# Patient Record
Sex: MALE | Race: WHITE | NOT HISPANIC OR LATINO | Employment: UNEMPLOYED
[De-identification: names, ages, dates, MRNs, and addresses within clinical notes are randomized per-mention and may not be internally consistent; named-entity substitution may affect disease eponyms.]

---

## 2017-02-10 DIAGNOSIS — J45.21 MILD INTERMITTENT ASTHMA WITH ACUTE EXACERBATION: ICD-10-CM

## 2017-02-10 RX ORDER — ALBUTEROL SULFATE 90 UG/1
AEROSOL, METERED RESPIRATORY (INHALATION)
Qty: 1 INHALER | Refills: 0 | Status: SHIPPED | OUTPATIENT
Start: 2017-02-10 | End: 2017-07-29

## 2017-02-10 NOTE — TELEPHONE ENCOUNTER
From: Garry Beatty  To: Norma Richards DO  Sent: 2/10/2017 9:35 AM CST  Subject: Medication Renewal Request    Original authorizing provider: DO Garry Kuhn would like a refill of the following medications:  Mometasone Furo-

## 2017-03-07 ENCOUNTER — HOSPITAL (OUTPATIENT)
Dept: OTHER | Age: 22
End: 2017-03-07
Attending: PHYSICAL MEDICINE & REHABILITATION

## 2017-03-22 ENCOUNTER — CHARTING TRANS (OUTPATIENT)
Dept: OTHER | Age: 22
End: 2017-03-22

## 2017-03-22 ENCOUNTER — HOSPITAL (OUTPATIENT)
Dept: OTHER | Age: 22
End: 2017-03-22
Attending: PHYSICAL MEDICINE & REHABILITATION

## 2017-03-29 RX ORDER — OMEPRAZOLE 20 MG/1
20 CAPSULE, DELAYED RELEASE ORAL AS NEEDED
COMMUNITY

## 2017-04-05 ENCOUNTER — ANESTHESIA EVENT (OUTPATIENT)
Dept: ENDOSCOPY | Facility: HOSPITAL | Age: 22
End: 2017-04-05
Payer: COMMERCIAL

## 2017-04-06 ENCOUNTER — ANESTHESIA (OUTPATIENT)
Dept: ENDOSCOPY | Facility: HOSPITAL | Age: 22
End: 2017-04-06
Payer: COMMERCIAL

## 2017-04-06 ENCOUNTER — SURGERY (OUTPATIENT)
Age: 22
End: 2017-04-06

## 2017-04-06 ENCOUNTER — HOSPITAL ENCOUNTER (OUTPATIENT)
Facility: HOSPITAL | Age: 22
Setting detail: HOSPITAL OUTPATIENT SURGERY
Discharge: HOME | End: 2017-04-06
Attending: INTERNAL MEDICINE | Admitting: INTERNAL MEDICINE
Payer: COMMERCIAL

## 2017-04-06 VITALS
WEIGHT: 145 LBS | OXYGEN SATURATION: 99 % | HEART RATE: 63 BPM | TEMPERATURE: 98 F | BODY MASS INDEX: 20.76 KG/M2 | RESPIRATION RATE: 20 BRPM | DIASTOLIC BLOOD PRESSURE: 74 MMHG | HEIGHT: 70 IN | SYSTOLIC BLOOD PRESSURE: 108 MMHG

## 2017-04-06 PROCEDURE — 0DB68ZX EXCISION OF STOMACH, VIA NATURAL OR ARTIFICIAL OPENING ENDOSCOPIC, DIAGNOSTIC: ICD-10-PCS | Performed by: INTERNAL MEDICINE

## 2017-04-06 PROCEDURE — 0DB98ZX EXCISION OF DUODENUM, VIA NATURAL OR ARTIFICIAL OPENING ENDOSCOPIC, DIAGNOSTIC: ICD-10-PCS | Performed by: INTERNAL MEDICINE

## 2017-04-06 PROCEDURE — 88305 TISSUE EXAM BY PATHOLOGIST: CPT | Performed by: INTERNAL MEDICINE

## 2017-04-06 RX ORDER — SODIUM CHLORIDE, SODIUM LACTATE, POTASSIUM CHLORIDE, CALCIUM CHLORIDE 600; 310; 30; 20 MG/100ML; MG/100ML; MG/100ML; MG/100ML
INJECTION, SOLUTION INTRAVENOUS CONTINUOUS
Status: DISCONTINUED | OUTPATIENT
Start: 2017-04-06 | End: 2017-04-06

## 2017-04-06 RX ORDER — NALOXONE HYDROCHLORIDE 0.4 MG/ML
80 INJECTION, SOLUTION INTRAMUSCULAR; INTRAVENOUS; SUBCUTANEOUS AS NEEDED
Status: DISCONTINUED | OUTPATIENT
Start: 2017-04-06 | End: 2017-04-06

## 2017-04-06 NOTE — H&P
History & Physical Examination    Patient Name: Niki Del Real  MRN: [id number]  CSN: [id number]  YOB: 1995    Diagnosis: Epigastric pain    Present Illness: Paraplegia, abdominal pain    Meds:omeprazole 20 MG Oral Capsule Delayed Release Ta allergies.     Past Medical History   Diagnosis Date   • Unspecified drug dependence, unspecified 04/2011     at [facility]   • Suicide and self-inflicted poisoning by unspecified drug or medicinal substance 4/11   • Unspecified site of spinal cord injury

## 2017-04-06 NOTE — OPERATIVE REPORT
Operative Report-Esophagogastroduodenoscopy      PREOPERATIVE DIAGNOSIS/INDICATION:  1. Epigastric pain  2. Nausea  POSTOPERTATIVE DIAGNOSIS:  1. Mild gastritis  2.  Otherwise normal upper endoscopy s/p biopsy  PROCEDURE PERFORMED:

## 2017-04-06 NOTE — ANESTHESIA POSTPROCEDURE EVALUATION
[address] Patient Status:  Hospital Outpatient Surgery   Age/Gender 23year old male MRN [id number]   Location [facility]. Attending No att. providers found   Hosp Day # 0 PCP Ruth Maharaj, DO       Anesthesia Post

## 2017-04-06 NOTE — ANESTHESIA PREPROCEDURE EVALUATION
PRE-OP EVALUATION    Patient Name: Wilfredo Chandler    Pre-op Diagnosis: NAUSEA WITH VOMITING, EPIGASTRIC ABDOMINAL PAIN    Procedure(s):  ESOPHAGOGASTRODUODENOSCOPY    Surgeon(s) and Role:     Guy Rene MD - Primary    Pre-op vitals reviewed Endo/Other                                  Pulmonary      (+) asthma                     Neuro/Psych  Comment: Neurogenic bladder. Paralysis of both legs.     (+) depression                                  Past Surgical History    LAMINECTOMY,>2 SGMT,LUM

## 2017-04-10 RX ORDER — MOMETASONE FUROATE AND FORMOTEROL FUMARATE DIHYDRATE 200; 5 UG/1; UG/1
AEROSOL RESPIRATORY (INHALATION)
Qty: 1 INHALER | Refills: 0 | Status: SHIPPED | OUTPATIENT
Start: 2017-04-10 | End: 2018-01-31

## 2017-04-10 NOTE — TELEPHONE ENCOUNTER
Pt last seen with Dr. Latoya Preciado 10/2016 for rectal bleeding. Pt will need appt with Dr. Latoya Preciado for further refills. Please call patient's Mom and have him schedule his appt.

## 2017-05-25 ENCOUNTER — TELEPHONE (OUTPATIENT)
Dept: FAMILY MEDICINE CLINIC | Facility: CLINIC | Age: 22
End: 2017-05-25

## 2017-05-25 ENCOUNTER — OFFICE VISIT (OUTPATIENT)
Dept: SURGERY | Facility: CLINIC | Age: 22
End: 2017-05-25

## 2017-05-25 VITALS
TEMPERATURE: 98 F | RESPIRATION RATE: 16 BRPM | HEIGHT: 70 IN | SYSTOLIC BLOOD PRESSURE: 128 MMHG | DIASTOLIC BLOOD PRESSURE: 80 MMHG | HEART RATE: 77 BPM

## 2017-05-25 DIAGNOSIS — M53.3 COCCYGODYNIA: Primary | ICD-10-CM

## 2017-05-25 PROCEDURE — 99245 OFF/OP CONSLTJ NEW/EST HI 55: CPT | Performed by: COLON & RECTAL SURGERY

## 2017-05-25 NOTE — PATIENT INSTRUCTIONS
This patient is well-known to our service from previous anal fissure and pruritus ani. He presents with new problems of coccygodynia, pain at the level of tailbone and cephalad. The patient's presenting history as listed below:     This patient is parap region. There is no extension of swelling or symptoms into the gluteal cleft region. The tailbone is actually fixed without fracture. There is no crepitance within the coccyx.   There is no evidence of pilonidal cyst.  There is good clearing of the patie can further  the patient regarding positioning and other aids that might help resolve the situation.     He should also be evaluated by rehab to make sure that he does not have an occult fracture of either the transverse processes or vertebral body i

## 2017-05-25 NOTE — TELEPHONE ENCOUNTER
Dr. Galvan Prior spoke to SARAH Thurston/Dr. Brewer's office and pt is to contact his Rehab doctor ASAP and schedule an appt and then have them contact Dr. Ruth Enciso office with their findings or send over to his office the progress note.   Pt states he has an appt in

## 2017-05-25 NOTE — TELEPHONE ENCOUNTER
Pt called and states that he just saw Dr. Kaykay Lopez but still c/o extreme tailbone pain. He said he was told by Dr. Tomás Swain that he should seek a second opinion because he may have a hairline fracture and maybe could use an MRI?   After discussing with RENALDO

## 2017-05-25 NOTE — PROGRESS NOTES
Visit Note       Active Problems      1. Coccygodynia          Chief Complaint   Patient presents with:  Anal Problem (GI): EST PT for rectal PN and bleedng. PT has alot of PN (10/10) around tail bone for about 5 days- doesn't feel any mass.  PT also has re last several weeks. His pain and symptoms have been quite severe over the last 4 days. He has no history of recent trauma. He has never had bedsores. He states that the swelling is new, the pain at the site is new.     He points again to the level Paternal Grandmother    • hyperlipidemia [Other] [OTHER] Father    • HTN [Other] [OTHER] Paternal Grandfather    • HTN [Other] [OTHER] Father    • Thyroid Disorder Maternal Grandfather    • stroke syndrome [Other] [OTHER] Paternal Grandfather      Social H Dosage unknown  Disp:  Rfl:    morphINE Sulfate (PF) in Sodium Chloride IT infusion 0.85 mg by Intrathecal route daily. Disp:  Rfl:    lidocaine (LIDODERM) 5 % Apply Externally Patch Place 2 patches onto the skin daily.    Disp:  Rfl: 0   LYRICA 300 MG Or patient's pruritus ani with no residual.  I do not see a fissure on today's examination, and all symptoms are away from the anus.   He is exquisitely tender over this slight area of swelling which extends approximately 8 cm in diameter and is completely sph tailbone near the anus. He complains of swelling at that site. He has had no fever or chills. The patient's mother reports that they have been trying to give him a new position while sleeping. They have actually put his head of the bed up slightly. aware.    The mother reports no recent falls or accidents as well, however states that he is very aggressive and position changes, exercise, and activity. I had a long counseling session regarding the need for further padding of the site.   I told them t

## 2017-06-07 RX ORDER — OXYBUTYNIN CHLORIDE 5 MG/1
TABLET ORAL
Qty: 405 TABLET | Refills: 0 | Status: SHIPPED | OUTPATIENT
Start: 2017-06-07 | End: 2017-09-03

## 2017-06-17 ENCOUNTER — APPOINTMENT (OUTPATIENT)
Dept: GENERAL RADIOLOGY | Facility: HOSPITAL | Age: 22
End: 2017-06-17
Attending: EMERGENCY MEDICINE
Payer: COMMERCIAL

## 2017-06-17 ENCOUNTER — HOSPITAL ENCOUNTER (EMERGENCY)
Facility: HOSPITAL | Age: 22
Discharge: HOME | End: 2017-06-17
Attending: EMERGENCY MEDICINE
Payer: COMMERCIAL

## 2017-06-17 VITALS
HEART RATE: 72 BPM | RESPIRATION RATE: 17 BRPM | OXYGEN SATURATION: 98 % | TEMPERATURE: 98 F | SYSTOLIC BLOOD PRESSURE: 130 MMHG | HEIGHT: 70 IN | DIASTOLIC BLOOD PRESSURE: 70 MMHG

## 2017-06-17 DIAGNOSIS — N39.0 URINARY TRACT INFECTION WITHOUT HEMATURIA, SITE UNSPECIFIED: Primary | ICD-10-CM

## 2017-06-17 DIAGNOSIS — K29.70 GASTRITIS WITHOUT BLEEDING, UNSPECIFIED CHRONICITY, UNSPECIFIED GASTRITIS TYPE: ICD-10-CM

## 2017-06-17 PROCEDURE — 71010 XR CHEST AP PORTABLE  (CPT=71010): CPT | Performed by: EMERGENCY MEDICINE

## 2017-06-17 PROCEDURE — 96360 HYDRATION IV INFUSION INIT: CPT

## 2017-06-17 PROCEDURE — 83605 ASSAY OF LACTIC ACID: CPT | Performed by: EMERGENCY MEDICINE

## 2017-06-17 PROCEDURE — 81001 URINALYSIS AUTO W/SCOPE: CPT | Performed by: EMERGENCY MEDICINE

## 2017-06-17 PROCEDURE — 99284 EMERGENCY DEPT VISIT MOD MDM: CPT

## 2017-06-17 PROCEDURE — 87086 URINE CULTURE/COLONY COUNT: CPT | Performed by: EMERGENCY MEDICINE

## 2017-06-17 PROCEDURE — 83690 ASSAY OF LIPASE: CPT | Performed by: EMERGENCY MEDICINE

## 2017-06-17 PROCEDURE — 80053 COMPREHEN METABOLIC PANEL: CPT | Performed by: EMERGENCY MEDICINE

## 2017-06-17 PROCEDURE — 51701 INSERT BLADDER CATHETER: CPT

## 2017-06-17 PROCEDURE — 87088 URINE BACTERIA CULTURE: CPT | Performed by: EMERGENCY MEDICINE

## 2017-06-17 PROCEDURE — 96361 HYDRATE IV INFUSION ADD-ON: CPT

## 2017-06-17 PROCEDURE — 87186 SC STD MICRODIL/AGAR DIL: CPT | Performed by: EMERGENCY MEDICINE

## 2017-06-17 PROCEDURE — 85025 COMPLETE CBC W/AUTO DIFF WBC: CPT | Performed by: EMERGENCY MEDICINE

## 2017-06-17 RX ORDER — FAMOTIDINE 40 MG/1
40 TABLET, FILM COATED ORAL 2 TIMES DAILY
Qty: 30 TABLET | Refills: 0 | Status: SHIPPED | OUTPATIENT
Start: 2017-06-17 | End: 2018-06-17

## 2017-06-17 RX ORDER — MAGNESIUM HYDROXIDE/ALUMINUM HYDROXICE/SIMETHICONE 120; 1200; 1200 MG/30ML; MG/30ML; MG/30ML
30 SUSPENSION ORAL ONCE
Status: COMPLETED | OUTPATIENT
Start: 2017-06-17 | End: 2017-06-17

## 2017-06-17 RX ORDER — SULFAMETHOXAZOLE AND TRIMETHOPRIM 800; 160 MG/1; MG/1
1 TABLET ORAL 2 TIMES DAILY
Qty: 20 TABLET | Refills: 0 | Status: SHIPPED | OUTPATIENT
Start: 2017-06-17 | End: 2017-06-27

## 2017-06-17 NOTE — ED INITIAL ASSESSMENT (HPI)
Pt c/o chills without fever, abdominal pain since this morning as well as AMS since yesterday. Pt does intermittently straight cath himself. Hx paraplegia.

## 2017-06-17 NOTE — ED PROVIDER NOTES
Patient Seen in: [facility] Emergency Department    History   Patient presents with:  Abdomen/Flank Pain (GI/)  Fever (infectious)  Altered Mental Status (neurologic)    Stated Complaint: abd pain, fever, ams    HPI    20-year-old male presents em mouth every morning before breakfast.   NON FORMULARY,  Daily Medicinal [facility]   amphetamine-dextroamphetamine (ADDERALL) 20 MG Oral Tab,  1 tab bid   Albuterol Sulfate HFA (PROAIR HFA) 108 (90 Base) MCG/ACT Inhalation Aero Soln,  TAKE 2 PUFFS BY MOUTH Pulse 06/17/17 1043 66   Resp 06/17/17 1043 18   Temp 06/17/17 1043 97.8 °F (36.6 °C)   Temp src 06/17/17 1043 Oral   SpO2 06/17/17 1043 97 %   O2 Device 06/17/17 1043 None (Room air)       Current:/70 mmHg  Pulse 72  Temp(Src) 97.8 °F (36.6 °C) (O order CBC WITH DIFFERENTIAL WITH PLATELET.   Procedure                               Abnormality         Status                     ---------                               -----------         ------                     CBC W/ DIFFERENTIAL[[id number]] type    Disposition:  Discharge    Follow-up:  Guicho Lee, [address] [facility]  [phone]            Medications Prescribed:  Discharge Medication List as of 6/17/2017  1:13 PM    START taking these medications    Sulfa

## 2017-07-01 PROBLEM — F90.0 ATTENTION DEFICIT HYPERACTIVITY DISORDER (ADHD), PREDOMINANTLY INATTENTIVE TYPE: Status: ACTIVE | Noted: 2017-07-01

## 2017-07-29 DIAGNOSIS — J45.21 MILD INTERMITTENT ASTHMA WITH ACUTE EXACERBATION: ICD-10-CM

## 2017-07-31 NOTE — TELEPHONE ENCOUNTER
proair approved qty 1 inhaler NR  Patient is due for a f/u with Dr Dougie Dennison  Please call to schedule appt  [phone] (home)

## 2017-08-10 DIAGNOSIS — J45.21 MILD INTERMITTENT ASTHMA WITH ACUTE EXACERBATION: ICD-10-CM

## 2017-08-10 RX ORDER — ALBUTEROL SULFATE 90 UG/1
AEROSOL, METERED RESPIRATORY (INHALATION)
Qty: 8.5 INHALER | Refills: 0 | Status: SHIPPED | OUTPATIENT
Start: 2017-08-10 | End: 2018-01-31

## 2017-08-10 NOTE — TELEPHONE ENCOUNTER
proair approved qty 1 inhaler NR  Patient last seen for asthma 10/27/2016  Please call to schedule appt for asthma-patient using rescue inhaler too frequently  [phone] (home)

## 2017-09-05 RX ORDER — OXYBUTYNIN CHLORIDE 5 MG/1
TABLET ORAL
Qty: 405 TABLET | Refills: 0 | Status: SHIPPED | OUTPATIENT
Start: 2017-09-05 | End: 2018-06-29

## 2018-01-31 ENCOUNTER — OFFICE VISIT (OUTPATIENT)
Dept: FAMILY MEDICINE CLINIC | Facility: CLINIC | Age: 23
End: 2018-01-31

## 2018-01-31 ENCOUNTER — TELEPHONE (OUTPATIENT)
Dept: FAMILY MEDICINE CLINIC | Facility: CLINIC | Age: 23
End: 2018-01-31

## 2018-01-31 VITALS — DIASTOLIC BLOOD PRESSURE: 53 MMHG | HEART RATE: 66 BPM | SYSTOLIC BLOOD PRESSURE: 104 MMHG

## 2018-01-31 DIAGNOSIS — T31.0 BURN (ANY DEGREE) INVOLVING LESS THAN 10% OF BODY SURFACE: Primary | ICD-10-CM

## 2018-01-31 DIAGNOSIS — S99.922A INJURY OF LEFT HEEL, INITIAL ENCOUNTER: ICD-10-CM

## 2018-01-31 DIAGNOSIS — J45.21 MILD INTERMITTENT ASTHMA WITH ACUTE EXACERBATION: ICD-10-CM

## 2018-01-31 PROCEDURE — 99214 OFFICE O/P EST MOD 30 MIN: CPT | Performed by: FAMILY MEDICINE

## 2018-01-31 RX ORDER — ALBUTEROL SULFATE 90 UG/1
AEROSOL, METERED RESPIRATORY (INHALATION)
Qty: 8.5 INHALER | Refills: 0 | Status: SHIPPED | OUTPATIENT
Start: 2018-01-31 | End: 2018-08-29

## 2018-01-31 NOTE — PATIENT INSTRUCTIONS
-- continue with silvedene 2x/day  -- call to schedule appt with wound clinic (make sure we are in your insurance network)  -- followup as needed, especially if asthma not improving with both inhalers

## 2018-01-31 NOTE — PROGRESS NOTES
Tremayne Rajan is a 23year old male here for Patient presents with:  Burn: burn on Left leg x 1 month       HPI:     Leonidas Beatty asleep with heating blanket on 1 month ago  -it fell and landed on the heel of his left foot  -left a large burn which blistered an Prescriptions:  Albuterol Sulfate HFA (PROAIR HFA) 108 (90 Base) MCG/ACT Inhalation Aero Soln TAKE 2 PUFFS BY MOUTH EVERY 4 HOURS AS NEEDED Disp: 8.5 Inhaler Rfl: 0   Mometasone Furo-Formoterol Fum (DULERA) 200-5 MCG/ACT Inhalation Aerosol INHALE 2 PUFFS I Rfl: 0       Allergies:  No Known Allergies      ROS:     --GEN: Denies  --HEENT: Denies  --RESP: Denies  --CV: Denies  --GI: Denies  --: Denies  --MSK: Denies  --NEURO: Denies  --PSYCH: Denies  --HEME/LYMPH/IMMUN: Denies  --ENDO: Denies  --SKIN: see hpi

## 2018-01-31 NOTE — TELEPHONE ENCOUNTER
Patient's insurance does not cover dulera  New rx for breo ellipta 100-25 mcg qty 1 inhaler + 2 refills sent to pharmacy per Dr Heidy Araujo

## 2018-02-12 ENCOUNTER — OFFICE VISIT (OUTPATIENT)
Dept: WOUND CARE | Age: 23
End: 2018-02-12
Attending: NURSE PRACTITIONER
Payer: COMMERCIAL

## 2018-02-12 ENCOUNTER — LAB ENCOUNTER (OUTPATIENT)
Dept: LAB | Age: 23
End: 2018-02-12
Attending: NURSE PRACTITIONER
Payer: COMMERCIAL

## 2018-02-12 DIAGNOSIS — S91.332A: Primary | ICD-10-CM

## 2018-02-12 DIAGNOSIS — S91.302A UNSPECIFIED OPEN WOUND, LEFT FOOT, INITIAL ENCOUNTER: Primary | ICD-10-CM

## 2018-02-12 DIAGNOSIS — T25.222A SECOND DEGREE BURN OF LEFT FOOT, INITIAL ENCOUNTER: ICD-10-CM

## 2018-02-12 LAB
ALBUMIN SERPL-MCNC: 3.5 G/DL (ref 3.5–4.8)
ALP LIVER SERPL-CCNC: 117 U/L (ref 45–117)
ALT SERPL-CCNC: 27 U/L (ref 17–63)
AST SERPL-CCNC: 17 U/L (ref 15–41)
BASOPHILS # BLD AUTO: 0.04 X10(3) UL (ref 0–0.1)
BASOPHILS NFR BLD AUTO: 0.6 %
BILIRUB SERPL-MCNC: 0.4 MG/DL (ref 0.1–2)
BUN BLD-MCNC: 5 MG/DL (ref 8–20)
CALCIUM BLD-MCNC: 9 MG/DL (ref 8.3–10.3)
CHLORIDE: 104 MMOL/L (ref 101–111)
CO2: 32 MMOL/L (ref 22–32)
CREAT BLD-MCNC: 0.47 MG/DL (ref 0.7–1.3)
EOSINOPHIL # BLD AUTO: 1.36 X10(3) UL (ref 0–0.3)
EOSINOPHIL NFR BLD AUTO: 19.9 %
ERYTHROCYTE [DISTWIDTH] IN BLOOD BY AUTOMATED COUNT: 12.4 % (ref 11.5–16)
GLUCOSE BLD-MCNC: 81 MG/DL (ref 70–99)
HCT VFR BLD AUTO: 41.3 % (ref 37–53)
HGB BLD-MCNC: 12.9 G/DL (ref 13–17)
IMMATURE GRANULOCYTE COUNT: 0.02 X10(3) UL (ref 0–1)
IMMATURE GRANULOCYTE RATIO %: 0.3 %
LYMPHOCYTES # BLD AUTO: 2.27 X10(3) UL (ref 0.9–4)
LYMPHOCYTES NFR BLD AUTO: 33.3 %
M PROTEIN MFR SERPL ELPH: 7.1 G/DL (ref 6.1–8.3)
MCH RBC QN AUTO: 27 PG (ref 27–33.2)
MCHC RBC AUTO-ENTMCNC: 31.2 G/DL (ref 31–37)
MCV RBC AUTO: 86.4 FL (ref 80–99)
MONOCYTES # BLD AUTO: 0.42 X10(3) UL (ref 0.1–1)
MONOCYTES NFR BLD AUTO: 6.2 %
NEUTROPHIL ABS PRELIM: 2.71 X10 (3) UL (ref 1.3–6.7)
NEUTROPHILS # BLD AUTO: 2.71 X10(3) UL (ref 1.3–6.7)
NEUTROPHILS NFR BLD AUTO: 39.7 %
PLATELET # BLD AUTO: 226 10(3)UL (ref 150–450)
POTASSIUM SERPL-SCNC: 3.7 MMOL/L (ref 3.6–5.1)
PREALBUMIN: 15.5 MG/DL (ref 20–40)
RBC # BLD AUTO: 4.78 X10(6)UL (ref 4.3–5.7)
RED CELL DISTRIBUTION WIDTH-SD: 39.7 FL (ref 35.1–46.3)
SODIUM SERPL-SCNC: 140 MMOL/L (ref 136–144)
WBC # BLD AUTO: 6.8 X10(3) UL (ref 4–13)

## 2018-02-12 PROCEDURE — 85025 COMPLETE CBC W/AUTO DIFF WBC: CPT

## 2018-02-12 PROCEDURE — 97602 WOUND(S) CARE NON-SELECTIVE: CPT

## 2018-02-12 PROCEDURE — 36415 COLL VENOUS BLD VENIPUNCTURE: CPT

## 2018-02-12 PROCEDURE — 80053 COMPREHEN METABOLIC PANEL: CPT

## 2018-02-12 PROCEDURE — 84134 ASSAY OF PREALBUMIN: CPT

## 2018-02-12 PROCEDURE — 99214 OFFICE O/P EST MOD 30 MIN: CPT

## 2018-02-13 NOTE — PROGRESS NOTES
Progress Note Details  Patient Name: [facility]  Date: 2/12/2018   Patient Number: [id number] Physician / Extender: Debra Mcdaniel   Patient YOB: 1995 Facility: [facility]    Chief Complaint  This information was obtain Cancer - Father, Diabetes - No History, Heart Disease - No History, Hypertension - Father, Kidney Disease - No History, Lung Disease - No History, Mental Illness - No History, Stroke - No History, Thyroid Problems - No History, Tuberculosis - No History, S Endocrine: Polydypsia (Excessive Thirst), Polyphagia (Excessive Hunger), Polyuria (Excessive Urination)  Hematologic/Lymphatic: Bleeding / Clotting Disorders, Enlarged Lymph Nodes  Psychiatric: Depression (Stable), Anxiety  Prior Wound History: Other (No h No inguinal lymphadenopathy. Musculoskeletal:  w/c bound-paraplegia. Psychiatric:  Intact judgement and insight. Approptiate affect.      Integumentary (Hair, Skin)  Wound #1 Left, Medial Heel is a chronic Full Thickness Burn and has received a stat Hair Growth on Extremity: Yes   Temperature of Extremity: Hot   Capillary Refill: < 3 seconds   Erythema: No   Dependent Rubor: No   Hyperpigmentation: No   Lipodermatosclerosis: No      Additional Information  RIGHT- Heel to back of knee Measurement: 15 CBC w/differential (Prescription for ICD-10 S91.302A - Unspecified open wound, left foot, initial encounter), CMP (Prescription for ICD-10 S91.302A - Unspecified open wound, left foot, initial encounter), Pre-albumin (Prescription for ICD-10 S91.302A - Uns

## 2018-02-19 ENCOUNTER — OFFICE VISIT (OUTPATIENT)
Dept: WOUND CARE | Age: 23
End: 2018-02-19
Attending: NURSE PRACTITIONER
Payer: COMMERCIAL

## 2018-02-19 DIAGNOSIS — T25.222A SECOND DEGREE BURN OF LEFT FOOT, INITIAL ENCOUNTER: ICD-10-CM

## 2018-02-19 DIAGNOSIS — S91.302A UNSPECIFIED OPEN WOUND, LEFT FOOT, INITIAL ENCOUNTER: Primary | ICD-10-CM

## 2018-02-19 PROCEDURE — 99213 OFFICE O/P EST LOW 20 MIN: CPT

## 2018-02-19 PROCEDURE — 16020 DRESS/DEBRID P-THICK BURN S: CPT

## 2018-02-20 NOTE — PROGRESS NOTES
Progress Note Details  Patient Name: Joaquin Lee  Date: 2/19/2018   Patient Number: [id number] Physician / Extender: Abby Parnell   Patient YOB: 1995 Facility: [facility]    Chief Complaint  This information was obtain Neurological: Paralysis (Paraplegia due to spinal cord injury), Other (Muscle spasms due to spinal cord injury)  Psychiatric: Other (ADHD on meds)    Patient denies complaints or symptoms related to:   Constitutional Symptoms (General Health):  Fatigue, Fev Wound #1 Left, Medial Heel is a chronic Full Thickness Burn and has received a status of Not Healed. Subsequent wound encounter measurements are 2cm length x 1.5cm width x 0.1cm depth, with an area of 3 sq cm and a volume of 0.3 cubic cm.  No tunneling has Wound stable, fibrotic slough sofened which was then excised to reveal wound bed. Few granulation buds noted. No s/s of infection. Santyl will be continued along with offloading.   Assessment    Active Problems    ICD-10  (Encounter Diagnosis) S91.302D - Un Discussed the Plan of Care at bedside with patient. The patient verbally acknowledges understanding of all instructions and all questions were answered. - Father verbalized understanding of POC  Wound type: - Burn  Wound improving. No s/s of infection.    P

## 2018-02-21 ENCOUNTER — TELEPHONE (OUTPATIENT)
Dept: FAMILY MEDICINE CLINIC | Facility: CLINIC | Age: 23
End: 2018-02-21

## 2018-02-26 ENCOUNTER — APPOINTMENT (OUTPATIENT)
Dept: WOUND CARE | Age: 23
End: 2018-02-26
Attending: NURSE PRACTITIONER
Payer: COMMERCIAL

## 2018-03-02 ENCOUNTER — OFFICE VISIT (OUTPATIENT)
Dept: WOUND CARE | Age: 23
End: 2018-03-02
Attending: NURSE PRACTITIONER
Payer: COMMERCIAL

## 2018-03-02 DIAGNOSIS — T25.222A SECOND DEGREE BURN OF LEFT FOOT, INITIAL ENCOUNTER: ICD-10-CM

## 2018-03-02 DIAGNOSIS — S91.302A UNSPECIFIED OPEN WOUND, LEFT FOOT, INITIAL ENCOUNTER: Primary | ICD-10-CM

## 2018-03-02 PROCEDURE — 97597 DBRDMT OPN WND 1ST 20 CM/<: CPT

## 2018-03-02 NOTE — PROGRESS NOTES
Progress Note Details  Patient Name: Adelina Boston  Date: 3/2/2018   Patient Number: [id number] Physician / Extender: Meghan Araujo   Patient YOB: 1995 Facility: [facility]    Chief Complaint  This information was obtaine This information was obtained from the patient  Patient complains of:   Respiratory: Other (Asthma)  Genitourinary (): Other (Stated he had UTI last week.)  Musculoskeletal: Assistive Devices (wheelchair bound), Other (Muscle spasms )  Neurological: Para Wound #1 Left, Medial Heel is a chronic Full Thickness Burn and has received a status of Not Healed. Subsequent wound encounter measurements are 1.5cm length x 1.8cm width x 0.1cm depth, with an area of 2.7 sq cm and a volume of 0.27 cubic cm.  No tunneling Wound Orders:  Wound #1 Left, Medial Heel     Topicals:  Initial Anesthetic Order: Apply lidocaine to wound bed on all future wound center visits during preparation for physician exam if wound bed contains fibrin or eschar.   4% Topical Lidocaine    Wound C Signed By:  Date:    Niki VALLADARES FNP-BC 03/02/2018 14:52:08     Entered By: Niki Hinton on 03/02/2018 14:51:29

## 2018-03-09 ENCOUNTER — OFFICE VISIT (OUTPATIENT)
Dept: WOUND CARE | Age: 23
End: 2018-03-09
Attending: NURSE PRACTITIONER
Payer: COMMERCIAL

## 2018-03-09 DIAGNOSIS — S91.302A UNSPECIFIED OPEN WOUND, LEFT FOOT, INITIAL ENCOUNTER: Primary | ICD-10-CM

## 2018-03-09 DIAGNOSIS — T25.222A SECOND DEGREE BURN OF LEFT FOOT, INITIAL ENCOUNTER: ICD-10-CM

## 2018-03-09 PROCEDURE — 16020 DRESS/DEBRID P-THICK BURN S: CPT

## 2018-03-16 ENCOUNTER — OFFICE VISIT (OUTPATIENT)
Dept: WOUND CARE | Age: 23
End: 2018-03-16
Attending: NURSE PRACTITIONER
Payer: COMMERCIAL

## 2018-03-16 DIAGNOSIS — S91.302A UNSPECIFIED OPEN WOUND, LEFT FOOT, INITIAL ENCOUNTER: Primary | ICD-10-CM

## 2018-03-16 DIAGNOSIS — T25.222A SECOND DEGREE BURN OF LEFT FOOT, INITIAL ENCOUNTER: ICD-10-CM

## 2018-03-16 PROCEDURE — 99213 OFFICE O/P EST LOW 20 MIN: CPT

## 2018-03-16 NOTE — PROGRESS NOTES
Progress Note Details  Patient Name: Shabnam Higginbotham  Date: 3/16/2018   Patient Number: [id number] Physician / Extender: Jenniffer Bustos   Patient YOB: 1995 Facility: [facility]    Chief Complaint  This information was obtain 3/9/18-Pt here for left heel wound management. Afebrile. Has been using dressing changes daily and offloading. Denies any pain. 3/16/18-Pt here for left heel wound management. Pt able to do dressing changes as ordered. Afebrile.  Offloading with heel offlo [place] +2 pedal pulses. Left heel wound. Musculoskeletal:  w/c bound-paraplegia. Psychiatric:  Intact judgement and insight. Approptiate affect.      Integumentary (Hair, Skin)  Wound #1 Left, Medial Heel is a chronic Full Thickness Burn and has receiv Follow-Up Appointments  Return Appointment in 1 week. Additional Orders:    Misc/Additional Orders  Increase dietary protein - Premier protein shakes twice a day  S/S of Infection    Care summary  Reviewed and evaluated labs. - 2/12/18 CBC low hb 12. 9;

## 2018-03-23 ENCOUNTER — OFFICE VISIT (OUTPATIENT)
Dept: WOUND CARE | Age: 23
End: 2018-03-23
Attending: NURSE PRACTITIONER
Payer: COMMERCIAL

## 2018-03-23 DIAGNOSIS — T25.222A SECOND DEGREE BURN OF LEFT FOOT, INITIAL ENCOUNTER: ICD-10-CM

## 2018-03-23 DIAGNOSIS — S91.302A UNSPECIFIED OPEN WOUND, LEFT FOOT, INITIAL ENCOUNTER: Primary | ICD-10-CM

## 2018-03-23 PROCEDURE — 99213 OFFICE O/P EST LOW 20 MIN: CPT

## 2018-03-23 NOTE — PROGRESS NOTES
Progress Note Details  Patient Name: Kalina Linker  Date: 3/23/2018   Patient Number: [id number] Physician / Extender: Rolo Mas   Patient YOB: 1995 Facility: [facility]    Chief Complaint  This information was obtain 3/9/18-Pt here for left heel wound management. Afebrile. Has been using dressing changes daily and offloading. Denies any pain. 3/16/18-Pt here for left heel wound management. Pt able to do dressing changes as ordered. Afebrile.  Offloading with heel offlo BP mild elevated during visit only. Pulse RRR. RR within normal limits. Afebrile. Within Ideal body weight range. Alert, calm, well developed, in no apparent distress. Height/Length: 70 in (177.8 cm), Weight: 160 lbs (72.73 kgs), BMI: 23, Temperature: 97. 1 Initial Anesthetic Order: Apply lidocaine to wound bed on all future wound center visits during preparation for physician exam if wound bed contains fibrin or eschar. 4% Topical Lidocaine    Wound Cleansing & Dressings  May shower with protection.   Cleans Signed By:  Date:    Debra VALLADARES, FNP-BC 03/23/2018 15:22:09     Entered By: Debra Mcdaniel on 03/23/2018 15:21:44

## 2018-03-30 ENCOUNTER — APPOINTMENT (OUTPATIENT)
Dept: WOUND CARE | Age: 23
End: 2018-03-30
Attending: NURSE PRACTITIONER
Payer: COMMERCIAL

## 2018-04-06 ENCOUNTER — APPOINTMENT (OUTPATIENT)
Dept: WOUND CARE | Age: 23
End: 2018-04-06
Attending: NURSE PRACTITIONER
Payer: COMMERCIAL

## 2018-04-13 ENCOUNTER — APPOINTMENT (OUTPATIENT)
Dept: WOUND CARE | Age: 23
End: 2018-04-13
Attending: NURSE PRACTITIONER
Payer: COMMERCIAL

## 2018-05-24 ENCOUNTER — HOSPITAL ENCOUNTER (OUTPATIENT)
Age: 23
Discharge: HOME | End: 2018-05-24
Attending: FAMILY MEDICINE
Payer: COMMERCIAL

## 2018-05-24 VITALS
DIASTOLIC BLOOD PRESSURE: 82 MMHG | SYSTOLIC BLOOD PRESSURE: 150 MMHG | TEMPERATURE: 99 F | OXYGEN SATURATION: 99 % | HEART RATE: 58 BPM | RESPIRATION RATE: 18 BRPM

## 2018-05-24 DIAGNOSIS — R10.30 LOWER ABDOMINAL PAIN: ICD-10-CM

## 2018-05-24 DIAGNOSIS — N39.0 URINARY TRACT INFECTION WITHOUT HEMATURIA, SITE UNSPECIFIED: Primary | ICD-10-CM

## 2018-05-24 PROCEDURE — 87186 SC STD MICRODIL/AGAR DIL: CPT | Performed by: FAMILY MEDICINE

## 2018-05-24 PROCEDURE — 99204 OFFICE O/P NEW MOD 45 MIN: CPT

## 2018-05-24 PROCEDURE — 96365 THER/PROPH/DIAG IV INF INIT: CPT

## 2018-05-24 PROCEDURE — 80047 BASIC METABLC PNL IONIZED CA: CPT

## 2018-05-24 PROCEDURE — 87088 URINE BACTERIA CULTURE: CPT | Performed by: FAMILY MEDICINE

## 2018-05-24 PROCEDURE — 99215 OFFICE O/P EST HI 40 MIN: CPT

## 2018-05-24 PROCEDURE — 87086 URINE CULTURE/COLONY COUNT: CPT | Performed by: FAMILY MEDICINE

## 2018-05-24 PROCEDURE — 85025 COMPLETE CBC W/AUTO DIFF WBC: CPT | Performed by: FAMILY MEDICINE

## 2018-05-24 PROCEDURE — 96361 HYDRATE IV INFUSION ADD-ON: CPT

## 2018-05-24 PROCEDURE — 81002 URINALYSIS NONAUTO W/O SCOPE: CPT | Performed by: FAMILY MEDICINE

## 2018-05-24 RX ORDER — ONDANSETRON 4 MG/1
4 TABLET, ORALLY DISINTEGRATING ORAL ONCE
Status: COMPLETED | OUTPATIENT
Start: 2018-05-24 | End: 2018-05-24

## 2018-05-24 RX ORDER — SODIUM CHLORIDE 9 MG/ML
1000 INJECTION, SOLUTION INTRAVENOUS ONCE
Status: COMPLETED | OUTPATIENT
Start: 2018-05-24 | End: 2018-05-24

## 2018-05-24 RX ORDER — CEPHALEXIN 500 MG/1
500 CAPSULE ORAL 2 TIMES DAILY
Qty: 14 CAPSULE | Refills: 0 | Status: SHIPPED | OUTPATIENT
Start: 2018-05-24 | End: 2018-05-31

## 2018-05-24 NOTE — ED INITIAL ASSESSMENT (HPI)
Yesterday more irritable per Mom. Lower abdominal discomfort. Today more uncomfortable. Urine concentrated. Non-verbal today. Not doing own self cath today. Tearful.

## 2018-05-24 NOTE — ED PROVIDER NOTES
Patient Seen in: [facility]    History   Patient presents with:  Urinary Symptoms (urologic)    Stated Complaint: poss uti, x1day     HPI    20year old male with history of paraplegia s/p T6 fracture and laminectomy.  Mother sta °F (37.2 °C)  Temp src: Temporal  SpO2: 98 %  O2 Device: None (Room air)    Current:/82   Pulse 58   Temp 99 °F (37.2 °C) (Temporal)   Resp 18   SpO2 99%         Physical Exam   Constitutional: He is oriented to person, place, and time.  He appears we Mother states he does bowel treatment but has not done for past 2 days. Start Keflex tomorrow as prescribed. Monitor for worsening symptoms, take him to ER if worsens.          Disposition and Plan     Clinical Impression:  Urinary tract infection without h

## 2018-05-25 NOTE — ED NOTES
Preliminary result received today:    Urine cx  Medications:  keflex  Pending test :  Sent for review to :  Notes:  Waiting for final

## 2018-06-25 RX ORDER — FLUTICASONE FUROATE AND VILANTEROL TRIFENATATE 100; 25 UG/1; UG/1
1 POWDER RESPIRATORY (INHALATION) DAILY
Qty: 60 EACH | Refills: 0 | Status: SHIPPED | OUTPATIENT
Start: 2018-06-25 | End: 2018-07-23

## 2018-06-29 RX ORDER — OXYBUTYNIN CHLORIDE 5 MG/1
TABLET ORAL
Qty: 405 TABLET | Refills: 0 | Status: SHIPPED | OUTPATIENT
Start: 2018-06-29 | End: 2018-09-24

## 2018-07-23 RX ORDER — FLUTICASONE FUROATE AND VILANTEROL TRIFENATATE 100; 25 UG/1; UG/1
POWDER RESPIRATORY (INHALATION)
Qty: 60 EACH | Refills: 0 | Status: SHIPPED | OUTPATIENT
Start: 2018-07-23 | End: 2018-08-29

## 2018-07-23 NOTE — TELEPHONE ENCOUNTER
rx approved qty 30 days NR  Patient past due for f/u for asthma  Please call to schedule appt-will need for further refills  [phone] (home)

## 2018-07-25 ENCOUNTER — HOSPITAL (OUTPATIENT)
Dept: OTHER | Age: 23
End: 2018-07-25
Attending: PHYSICAL MEDICINE & REHABILITATION

## 2018-08-29 ENCOUNTER — OFFICE VISIT (OUTPATIENT)
Dept: FAMILY MEDICINE CLINIC | Facility: CLINIC | Age: 23
End: 2018-08-29
Payer: COMMERCIAL

## 2018-08-29 VITALS — SYSTOLIC BLOOD PRESSURE: 109 MMHG | DIASTOLIC BLOOD PRESSURE: 71 MMHG | HEART RATE: 91 BPM

## 2018-08-29 DIAGNOSIS — R41.0 CONFUSION: Primary | ICD-10-CM

## 2018-08-29 DIAGNOSIS — J45.30 MILD PERSISTENT ASTHMA WITHOUT COMPLICATION: ICD-10-CM

## 2018-08-29 DIAGNOSIS — R53.83 LETHARGY: ICD-10-CM

## 2018-08-29 DIAGNOSIS — F32.5 MAJOR DEPRESSIVE DISORDER, SINGLE EPISODE IN FULL REMISSION (HCC): ICD-10-CM

## 2018-08-29 PROCEDURE — 99214 OFFICE O/P EST MOD 30 MIN: CPT | Performed by: FAMILY MEDICINE

## 2018-08-29 RX ORDER — ALBUTEROL SULFATE 90 UG/1
2 AEROSOL, METERED RESPIRATORY (INHALATION) EVERY 4 HOURS PRN
Qty: 1 INHALER | Refills: 0 | Status: SHIPPED | OUTPATIENT
Start: 2018-08-29 | End: 2018-09-29

## 2018-08-29 NOTE — PROGRESS NOTES
Carrie Orozco is a 20year old male. HPI:         Confusion and lethargy:  5 episodes, becoming more frequent. Per mom, pt is tired and \"out of it\". Gives one word answers, doesn't eat, barely drinks water during these episodes.     Occurred in May, MG Oral Capsule Delayed Release Take 20 mg by mouth as needed. Disp:  Rfl:    NON FORMULARY [facility] Disp:  Rfl:    Meclizine HCl 12.5 MG Oral Tab Take 12.5-25 mg by mouth 3 (three) times daily as needed for Dizziness.    Disp:  Rfl: 0 spinal cord injury without evidence of spinal bone injury     spinal compression t4-t6      Past Surgical History:  2012: CATH INFERIOR VENA CAVA FILTER INSERTION  1/20/2012: LAMINECTOMY,>2 SGMT,LUMBAR      Comment: T6 paraplegic  No date: OTHER SURGICAL H rate, regular rhythm and normal heart sounds. No murmur heard. Pulmonary/Chest: Effort normal and breath sounds normal. He has no wheezes. He has no rales. Lymphadenopathy:     He has no cervical adenopathy.    Neurological: He is alert and oriented t puffs into the lungs every 4 (four) hours as needed for Wheezing (cough). Fluticasone Furoate-Vilanterol (BREO ELLIPTA) 100-25 MCG/INH Inhalation Aerosol Powder, Breath Activated 3 each 0      Sig: Inhale 1 puff into the lungs daily.            Imaging

## 2018-08-29 NOTE — PATIENT INSTRUCTIONS
After your visit at the [place] office  today,  please direct any follow up questions or medication needs to the staff in our  [place] office so that your concerns may be promptly addressed.   We are available through [employer] or at the numbers below: Tests:    If your physician has ordered radiology tests such as MRI or CT scans, do not schedule the test until this office has notified you that the test has been approved by your insurer. Depending on your insurance carrier, approval may take 3-10 days. • Failure to follow above steps may result in the delay of form completion. EEG INSTRUCTIONS:    TESTING:    · You will have 23 electrodes placed on your head for the EEG and 2 on your chest for an EKG tracing.   · Head will be measured using a washa

## 2018-08-29 NOTE — PROGRESS NOTES
Patient here for evaluation of episodes of lethargy and confusion. Have been occurring since May 2018.

## 2018-08-29 NOTE — PROGRESS NOTES
[place] [address]   Neurology- INITIAL CLINIC VISIT  2018, 1:21 PM     Aniceto Mclaughlin Patient Status:  No patient class for patient encounter    2/15/1995 MRN [id number]   [facility] self-inflicted poisoning by unspecified drug or medicinal substance(E950.5) 4/11   • Unspecified drug dependence, unspecified 04/2011    at [facility]   • Unspecified site of spinal cord injury without evidence of spinal bone injury     spinal compression amphetamine-dextroamphetamine 30 MG Oral Tab, Take 1 tablet (30 mg total) by mouth 2 (two) times daily.  Collaborating physician: Dr. Romero Market: 60 tablet, Rfl: 0  •  sodium chloride 0.9 % SOLN 200 mL with Bupivacaine HCl (PF) 0.5 % SOLN 1,000 mg, by nerves II-XII: Optic discs were sharp. Pupils were round and reacted to light. Extraocular movements were full. There was no face, jaw, palate or tongue weakness or atrophy. Hearing was grossly intact.  Shoulder shrug was normal.   Motor exam revealed jefry trauma      No prescriptions requested or ordered in this encounter          We discussed in depth regarding the diagnosis, prognosis, treatment. The patient was given ample opportunity to ask questions. All questions and concerns were addressed.      [employer]

## 2018-08-30 LAB
T4, FREE: 1.1 NG/DL (ref 0.8–1.8)
TSH: 0.45 MIU/L (ref 0.4–4.5)
VITAMIN B12: 636 PG/ML (ref 200–1100)

## 2018-09-05 PROBLEM — R41.0 CONFUSION: Status: ACTIVE | Noted: 2018-09-05

## 2018-09-05 PROBLEM — J45.30 MILD PERSISTENT ASTHMA WITHOUT COMPLICATION: Status: ACTIVE | Noted: 2018-09-05

## 2018-09-05 PROBLEM — J45.30 MILD PERSISTENT ASTHMA WITHOUT COMPLICATION (HCC): Status: ACTIVE | Noted: 2018-09-05

## 2018-09-05 PROBLEM — R53.83 LETHARGY: Status: ACTIVE | Noted: 2018-09-05

## 2018-09-24 RX ORDER — OXYBUTYNIN CHLORIDE 5 MG/1
TABLET ORAL
Qty: 405 TABLET | Refills: 0 | Status: SHIPPED | OUTPATIENT
Start: 2018-09-24 | End: 2019-08-31

## 2018-09-28 ENCOUNTER — NURSE ONLY (OUTPATIENT)
Dept: ELECTROPHYSIOLOGY | Facility: HOSPITAL | Age: 23
End: 2018-09-28
Attending: Other
Payer: COMMERCIAL

## 2018-09-28 DIAGNOSIS — R46.89 EPISODE OF ABNORMAL BEHAVIOR: ICD-10-CM

## 2018-09-28 PROCEDURE — 95819 EEG AWAKE AND ASLEEP: CPT | Performed by: OTHER

## 2018-09-29 DIAGNOSIS — J45.30 MILD PERSISTENT ASTHMA WITHOUT COMPLICATION: ICD-10-CM

## 2018-10-01 RX ORDER — ALBUTEROL SULFATE 90 UG/1
2 AEROSOL, METERED RESPIRATORY (INHALATION) EVERY 4 HOURS PRN
Qty: 1 INHALER | Refills: 0 | Status: SHIPPED | OUTPATIENT
Start: 2018-10-01 | End: 2019-01-02

## 2018-10-01 NOTE — PROCEDURES
ELECTROENCEPHALOGRAM REPORT      Patient Name: Niki Parkinson  Chart ID: [id number]  Ordering Physician: Patsy Larose Date of Test: 9/27/18  Referring Physician: Therese Comer  Patient Diagnosis: Episode of abnormal behavior    Description of procedure:  Po

## 2018-10-03 ENCOUNTER — TELEPHONE (OUTPATIENT)
Dept: NEUROLOGY | Facility: CLINIC | Age: 23
End: 2018-10-03

## 2018-10-03 ENCOUNTER — OFFICE VISIT (OUTPATIENT)
Dept: FAMILY MEDICINE CLINIC | Facility: CLINIC | Age: 23
End: 2018-10-03
Payer: COMMERCIAL

## 2018-10-03 VITALS — HEART RATE: 76 BPM | DIASTOLIC BLOOD PRESSURE: 76 MMHG | SYSTOLIC BLOOD PRESSURE: 114 MMHG | TEMPERATURE: 98 F

## 2018-10-03 DIAGNOSIS — K62.89 RECTAL PAIN: ICD-10-CM

## 2018-10-03 DIAGNOSIS — Z87.19 HISTORY OF ANAL FISSURES: ICD-10-CM

## 2018-10-03 DIAGNOSIS — J45.30 MILD PERSISTENT ASTHMA WITHOUT COMPLICATION: Primary | ICD-10-CM

## 2018-10-03 DIAGNOSIS — R46.89 EPISODE OF ABNORMAL BEHAVIOR: ICD-10-CM

## 2018-10-03 DIAGNOSIS — G89.21 CHRONIC PAIN DUE TO TRAUMA: Primary | ICD-10-CM

## 2018-10-03 DIAGNOSIS — Z23 NEED FOR VACCINATION: ICD-10-CM

## 2018-10-03 PROCEDURE — 90686 IIV4 VACC NO PRSV 0.5 ML IM: CPT | Performed by: FAMILY MEDICINE

## 2018-10-03 PROCEDURE — 99214 OFFICE O/P EST MOD 30 MIN: CPT | Performed by: FAMILY MEDICINE

## 2018-10-03 PROCEDURE — 90471 IMMUNIZATION ADMIN: CPT | Performed by: FAMILY MEDICINE

## 2018-10-03 RX ORDER — NALOXONE HYDROCHLORIDE 4 MG/.1ML
SPRAY NASAL
Refills: 0 | Status: ON HOLD | COMMUNITY
Start: 2018-09-20 | End: 2019-04-13

## 2018-10-03 RX ORDER — DULOXETIN HYDROCHLORIDE 60 MG/1
60 CAPSULE, DELAYED RELEASE ORAL DAILY
COMMUNITY
End: 2018-10-23

## 2018-10-03 NOTE — PATIENT INSTRUCTIONS
Understanding Asthma    Asthma causes swelling and narrowing of the airways in your lungs. Medical experts are not exactly sure what causes asthma. It is believed to be caused by a mix of inherited and environmental factors.   Healthy lungs  Inside the natividad Moderate flare-ups  When sensitive airways are irritated by a trigger, the muscles around the airways tighten. The lining of the airways swells. Thick, sticky mucus increases and partly clogs the airways.  All of this makes you work harder to keep breathing

## 2018-10-03 NOTE — PROGRESS NOTES
[place] [address]   Neurology- INITIAL CLINIC VISIT  2018, 1:21 PM     Daphney Bliss Patient Status:  No patient class for patient encounter    2/15/1995 MRN [id number]   [facility], [facility] hallucinations of his dog.     Pertinent imaging and laboratory work-up:   CT head 8/2018- reportedly normal  EEG 10/1/18- This is a normal, awake and asleep electroencephalogram.  There is no clear-cut focal abnormality or epileptiform activity seen in thi daily., Disp: , Rfl:   •  Fluticasone Furoate-Vilanterol (BREO ELLIPTA) 100-25 MCG/INH Inhalation Aerosol Powder, Breath Activated, Inhale 1 puff into the lungs daily. , Disp: 3 each, Rfl: 1  •  Albuterol Sulfate HFA (PROAIR HFA) 108 (90 Base) MCG/ACT Inhal daily., Disp: , Rfl:       Review of Systems:   A comprehensive 10 point review of systems was completed.     Pertinent positives and negatives noted in the HPI.          PHYSICAL EXAM:   Neurologic Exam  Vitals   10/03/18  1522   BP: 116/70   Pulse: 84   R remembers beginning and end but not middle of episode. The duration is very long to be considered seizures. Patient has no recollection of the episodes.  Baclofen  Was not overdosed and this was confirmed on data download by physiatrist. I recommend a 24 ho

## 2018-10-03 NOTE — PATIENT INSTRUCTIONS
After your visit at the [facility] office  today,  please direct any follow up questions or medication needs to the staff in our  [place] office so that your concerns may be promptly addressed.   We are available through [employer] or at the numbers below: Tests:    If your physician has ordered radiology tests such as MRI or CT scans, do not schedule the test until this office has notified you that the test has been approved by your insurer. Depending on your insurance carrier, approval may take 3-10 days. • Failure to follow above steps may result in the delay of form completion.

## 2018-10-03 NOTE — TELEPHONE ENCOUNTER
Pt in office for visit today with Dr. Lizzie Palmer. Per Dr. Lizzie Palmer, to get compound cream from [address] [place] [address]. Faxed order form, insurance and pt demo sheet. Confirmation received.      Per form, Dr. Lizzie Palmer is ok to substitute for similar or other stren

## 2018-10-03 NOTE — PROGRESS NOTES
Daphney Bliss is a 20year old male. HPI:     Patient is accompanied by his mother who is pleasant and supportive. Mild persistent asthma:  Patient feels he is doing well on the [facility]. Infrequently needs to use the albuterol MDI.   Tolerating Bupivacaine HCl (PF) 0.5 % SOLN 1,000 mg by Intercatheter route once. Disp:  Rfl:    omeprazole 20 MG Oral Capsule Delayed Release Take 20 mg by mouth as needed.    Disp:  Rfl:    NON FORMULARY Daily Medicinal [facility] Disp:  Rfl:    Meclizine HCl 12.5 MG Past Medical History:   Diagnosis Date   • Asthma    • Suicide and self-inflicted poisoning by unspecified drug or medicinal substance(E950.5) 4/11   • Unspecified drug dependence, unspecified 04/2011    at [facility]   • Unspecified site of spinal cord Constitutional: He is oriented to person, place, and time. He appears well-developed and well-nourished. No distress. Patient sitting in wheelchair. HENT:   Head: Normocephalic and atraumatic.    Eyes: Conjunctivae and EOM are normal. No scleral icter Fluticasone Furoate-Vilanterol (BREO ELLIPTA) 100-25 MCG/INH Inhalation Aerosol Powder, Breath Activated 3 each 1     Sig: Inhale 1 puff into the lungs daily.        Imaging & Consults:  SURGERY - INTERNAL  FLULAVAL INFLUENZA VACCINE QUAD PRESERVATIVE FREE

## 2019-01-02 DIAGNOSIS — J45.30 MILD PERSISTENT ASTHMA WITHOUT COMPLICATION: ICD-10-CM

## 2019-01-02 RX ORDER — ALBUTEROL SULFATE 90 UG/1
2 AEROSOL, METERED RESPIRATORY (INHALATION) EVERY 4 HOURS PRN
Qty: 1 INHALER | Refills: 0 | Status: SHIPPED | OUTPATIENT
Start: 2019-01-02 | End: 2019-07-13

## 2019-04-11 ENCOUNTER — HOSPITAL ENCOUNTER (OUTPATIENT)
Facility: HOSPITAL | Age: 24
Setting detail: OBSERVATION
Discharge: HOME | End: 2019-04-13
Attending: EMERGENCY MEDICINE | Admitting: HOSPITALIST
Payer: COMMERCIAL

## 2019-04-11 ENCOUNTER — APPOINTMENT (OUTPATIENT)
Dept: CT IMAGING | Facility: HOSPITAL | Age: 24
End: 2019-04-11
Attending: EMERGENCY MEDICINE
Payer: COMMERCIAL

## 2019-04-11 DIAGNOSIS — R41.82 ALTERED MENTAL STATUS, UNSPECIFIED ALTERED MENTAL STATUS TYPE: Primary | ICD-10-CM

## 2019-04-11 PROCEDURE — 95816 EEG AWAKE AND DROWSY: CPT | Performed by: OTHER

## 2019-04-11 PROCEDURE — 99219 INITIAL OBSERVATION CARE,LEVL II: CPT | Performed by: HOSPITALIST

## 2019-04-11 PROCEDURE — 99245 OFF/OP CONSLTJ NEW/EST HI 55: CPT | Performed by: OTHER

## 2019-04-11 PROCEDURE — 70450 CT HEAD/BRAIN W/O DYE: CPT | Performed by: EMERGENCY MEDICINE

## 2019-04-11 RX ORDER — POTASSIUM CHLORIDE 14.9 MG/ML
20 INJECTION INTRAVENOUS ONCE
Status: COMPLETED | OUTPATIENT
Start: 2019-04-12 | End: 2019-04-13

## 2019-04-11 RX ORDER — BISACODYL 10 MG
10 SUPPOSITORY, RECTAL RECTAL DAILY
Status: DISCONTINUED | OUTPATIENT
Start: 2019-04-11 | End: 2019-04-13

## 2019-04-11 RX ORDER — ALBUTEROL SULFATE 90 UG/1
2 AEROSOL, METERED RESPIRATORY (INHALATION) EVERY 4 HOURS PRN
Status: DISCONTINUED | OUTPATIENT
Start: 2019-04-11 | End: 2019-04-13

## 2019-04-11 RX ORDER — ACETAMINOPHEN 325 MG/1
650 TABLET ORAL EVERY 6 HOURS PRN
Status: DISCONTINUED | OUTPATIENT
Start: 2019-04-11 | End: 2019-04-13

## 2019-04-11 RX ORDER — LIDOCAINE 50 MG/G
2 PATCH TOPICAL EVERY 24 HOURS
Status: DISCONTINUED | OUTPATIENT
Start: 2019-04-11 | End: 2019-04-13

## 2019-04-11 RX ORDER — MECLIZINE HCL 12.5 MG/1
TABLET ORAL 3 TIMES DAILY PRN
Status: DISCONTINUED | OUTPATIENT
Start: 2019-04-11 | End: 2019-04-13

## 2019-04-11 RX ORDER — DULOXETIN HYDROCHLORIDE 60 MG/1
60 CAPSULE, DELAYED RELEASE ORAL DAILY
COMMUNITY
End: 2019-04-16

## 2019-04-11 RX ORDER — PREGABALIN 100 MG/1
300 CAPSULE ORAL 2 TIMES DAILY
Status: DISCONTINUED | OUTPATIENT
Start: 2019-04-11 | End: 2019-04-13

## 2019-04-11 RX ORDER — POTASSIUM CHLORIDE 20 MEQ/1
60 TABLET, EXTENDED RELEASE ORAL ONCE
Status: DISCONTINUED | OUTPATIENT
Start: 2019-04-11 | End: 2019-04-13

## 2019-04-11 RX ORDER — SODIUM CHLORIDE 9 MG/ML
INJECTION, SOLUTION INTRAVENOUS CONTINUOUS
Status: DISCONTINUED | OUTPATIENT
Start: 2019-04-11 | End: 2019-04-13

## 2019-04-11 RX ORDER — DULOXETIN HYDROCHLORIDE 60 MG/1
60 CAPSULE, DELAYED RELEASE ORAL DAILY
Status: DISCONTINUED | OUTPATIENT
Start: 2019-04-11 | End: 2019-04-13

## 2019-04-11 RX ORDER — DEXTROAMPHETAMINE SACCHARATE, AMPHETAMINE ASPARTATE, DEXTROAMPHETAMINE SULFATE AND AMPHETAMINE SULFATE 2.5; 2.5; 2.5; 2.5 MG/1; MG/1; MG/1; MG/1
30 TABLET ORAL 2 TIMES DAILY
Status: DISCONTINUED | OUTPATIENT
Start: 2019-04-11 | End: 2019-04-13

## 2019-04-11 RX ORDER — ONDANSETRON 2 MG/ML
4 INJECTION INTRAMUSCULAR; INTRAVENOUS EVERY 6 HOURS PRN
Status: DISCONTINUED | OUTPATIENT
Start: 2019-04-11 | End: 2019-04-13

## 2019-04-11 NOTE — ED INITIAL ASSESSMENT (HPI)
Per pts mother, yesterday pt started to be more \"depressed and lethargic\". Per pts mother pt has had these \"episoides\" before and has had work ups with neurology for the same symptoms.         Pt also c/o abdominal pain     Pt states he does feel sad, b

## 2019-04-11 NOTE — H&P
[facility]PITAL[facility]  History and Physical     Valeriy Murguia Patient Status:  Emergency    2/15/1995 MRN [id number]   Location [address] [facility] Attending Joy Puente MD   Hosp Day # 0 PCP Augusto Tobar DO     Chief Complain Onset   • Other (diabetes mellitus [Other]) Paternal Grandmother    • Other (HTN [Other]) Paternal Grandfather    • Other (stroke syndrome [Other]) Paternal Grandfather    • Allergies Sister    • Asthma Sister    • Other (colon cancer [Other]) Maternal Gra mL with Bupivacaine HCl (PF) 0.5 % SOLN 1,000 mg by Intercatheter route once. Disp:  Rfl:    omeprazole 20 MG Oral Capsule Delayed Release Take 20 mg by mouth as needed.    Disp:  Rfl:    NON FORMULARY Daily Medicinal [facility] Disp:  Rfl:    Meclizine HCl No rashes or lesions. Psychiatric: Appropriate mood and affect.       Diagnostic Data:      Labs:  Recent Labs   Lab 04/11/19  1328   WBC 6.3   HGB 14.2   MCV 80.8   .0       Recent Labs   Lab 04/11/19  1328   *   BUN 11   CREATSERUM 0.69*

## 2019-04-11 NOTE — ED PROVIDER NOTES
Patient Seen in: [facility] Emergency Department    History   Patient presents with:  Altered Mental Status (neurologic)  Eval-P (psychiatric)  Abdomen/Flank Pain (GI/)    Stated Complaint: AMS    HPI    Patient is a 27-year-old male with a histor Surgical History:   Procedure Laterality Date   • CATH INFERIOR VENA CAVA FILTER INSERTION  2012   • ESOPHAGOGASTRODUODENOSCOPY (EGD) N/A 4/6/2017    Performed by Jorge Washington MD at [facility] ENDOSCOPY   • LAMINECTOMY,>2 [facility]  1/20/2012    T6 para eyes to voice, look to voice and mumbles answers to questions. He will follow commands a little bit. Difficult to evaluate thought process because of his limited answers. Nursing note and vitals reviewed.           ED Course     Labs Reviewed   COMP MET DIFFERENTIAL[[id number]]          Abnormal            Final result                 Please view results for these tests on the individual orders.    RAINBOW DRAW BLUE   RAINBOW DRAW LAVENDER   RAINBOW DRAW LIGHT GREEN   RAINBOW DRAW GOLD   URINE CULTURE, ROUT Requests Keppra 1 g IV and feels patient should be admitted medically for further evaluation.       Disposition and Plan     Clinical Impression:  Altered mental status, unspecified altered mental status type  (primary encounter diagnosis)    Disposition:

## 2019-04-11 NOTE — PROCEDURES
Date of Procedure: 4/11/2019    Procedure: EEG (ELECTROENCEPHALOGRAM)     DX: 30year old male, LETHARGIC DEPRESSED  HX:INSOMNIA, DEPRESSION, ABNORMAL MVT, CONFUSION, LETHARGY, ASTHMA  PMH::INSOMNIA, DEPRESSION, ABNORMAL MVT, CONFUSION, LETHARGY, ASTHMA  R

## 2019-04-11 NOTE — CONSULTS
[address]  Neurology Consultation    Date of consult: 4/11/2019    Reason for consult: altered mental status    HPI: Rozina Romero is a 30year old male with with a history of paraplegia secondary to spinal cord injury, depression who present medicinal substance(E950.5) 4/11   • Unspecified drug dependence, unspecified 04/2011    at [facility]   • Unspecified site of spinal cord injury without evidence of spinal bone injury     spinal compression t4-t6     Past Surgical History:   Procedure La weakness or atrophy. Hearing was grossly intact. Shoulder shrug was normal.   Motor exam revealed normal muscle bulk and tone. No atrophy or fasciculations.  Manual muscle testing revealed MRC grade 5/5 strength in arms, 0/5 in legs  Deep tendon reflexes we

## 2019-04-12 ENCOUNTER — APPOINTMENT (OUTPATIENT)
Dept: MRI IMAGING | Facility: HOSPITAL | Age: 24
End: 2019-04-12
Attending: Other
Payer: COMMERCIAL

## 2019-04-12 PROCEDURE — 99225 SUBSEQUENT OBSERVATION CARE: CPT | Performed by: HOSPITALIST

## 2019-04-12 PROCEDURE — 99226 SUBSEQUENT OBSERVATION CARE: CPT | Performed by: OTHER

## 2019-04-12 PROCEDURE — 70553 MRI BRAIN STEM W/O & W/DYE: CPT | Performed by: OTHER

## 2019-04-12 RX ORDER — POLYETHYLENE GLYCOL 3350 17 G/17G
17 POWDER, FOR SOLUTION ORAL DAILY
Status: DISCONTINUED | OUTPATIENT
Start: 2019-04-12 | End: 2019-04-13

## 2019-04-12 RX ORDER — OXYBUTYNIN CHLORIDE 5 MG/1
7.5 TABLET ORAL DAILY
Status: DISCONTINUED | OUTPATIENT
Start: 2019-04-12 | End: 2019-04-13

## 2019-04-12 RX ORDER — DOCUSATE SODIUM 100 MG/1
100 CAPSULE, LIQUID FILLED ORAL 2 TIMES DAILY
Status: DISCONTINUED | OUTPATIENT
Start: 2019-04-12 | End: 2019-04-13

## 2019-04-12 RX ORDER — POTASSIUM CHLORIDE 20 MEQ/1
40 TABLET, EXTENDED RELEASE ORAL EVERY 4 HOURS
Status: DISPENSED | OUTPATIENT
Start: 2019-04-12 | End: 2019-04-12

## 2019-04-12 RX ORDER — SENNOSIDES 8.6 MG
8.6 TABLET ORAL 2 TIMES DAILY
Status: DISCONTINUED | OUTPATIENT
Start: 2019-04-12 | End: 2019-04-13

## 2019-04-12 NOTE — PROGRESS NOTES
[facility]ITAL[facility]  Progress Note     Bran Sheffield Patient Status:  Observation    2/15/1995 MRN [id number]   [facility] 3NE-A Attending Thomas Downs MD   Hosp Day # 0 PCP Daniella Kasper,      Chief Complaint: Seizure    S: [employer] Daily   • docusate sodium  100 mg Oral BID   • Senna  8.6 mg Oral BID   • PEG 3350  17 g Oral Daily   • levETIRAcetam  750 mg Intravenous Q8H   • cefTRIAXone  1 g Intravenous Q24H   • Potassium Chloride ER  60 mEq Oral Once   • amphetamine-dextroamphetamin

## 2019-04-12 NOTE — PLAN OF CARE
Pt alert and oriented. Denies pain. C/o nausea, PRN IV zofran given with some relief noted. VSS. Afebrile. IVF, abx, keppra. K replaced per protocol, redraw tomorrow AM.  Pt refused stool softeners. Pt  straight caths self.   Pt able to transfer to

## 2019-04-12 NOTE — PROGRESS NOTES
Diet order received. Hospitalist wayne with patient eating in neurology was. Dr. Boucher Agent approved patient to eat. MRI brain on hold till AM. Patient has pain pump and mother is not exactly aware of model number.  Will need pain service to interrogate pain pump

## 2019-04-12 NOTE — PROGRESS NOTES
[id number] [facility] Neurology Progress Note    Justino Cha Patient Status:  Observation    2/15/1995 MRN [id number]   [facility] 3NE-A Attending Prudencio Buerger, MD   Hosp Day # 0 PCP Zahira Gomez DO         Subjective:  Monica Anguiano Hz spike and waves suggestive of electrographic non convulsive status epilepticus. A follow up study is needed. Clinical correlation is advised.     Labs:  UA with nitrite, LE, WBC, cx pending  EtOh, acetaminophen, salicylate, all neg    Assessment:   Non-c %   04/12/19 0032 98/33 98.1 °F (36.7 °C) Oral 81 18 97 %       Gen: well developed, well nourished, no acute distress  HEENT: normocephalic  Eyes: sclera anicteric  Heart; normal S1/S2, regular rate and rhythm  Lungs: clear to auscultation bilaterally BUN 9 04/12/2019    BUN 11 04/11/2019    BUN 5 (L) 02/12/2018     Lab Results   Component Value Date    CREATSERUM 0.60 (L) 04/12/2019    CREATSERUM 0.69 (L) 04/11/2019    CREATSERUM 0.47 (L) 02/12/2018       CBC:    Lab Results   Component Value Date    W

## 2019-04-12 NOTE — PLAN OF CARE
Pt A/O x 4. Seizure precautions per protocol. Keppra IV scheduled  Drowsy around shift change but has become less drowsy overnight.   MRI scheduled for AM- screening complete with patient- IT pain pump settings copied and placed in chart  Neuro on consult t patient fatigue and changes in neurological status  - Encourage and assist patient to increase activity and self care with guidance from PT/OT  - Encourage visually impaired, hearing impaired and aphasic patients to use assistive/communication devices  Out

## 2019-04-12 NOTE — PROGRESS NOTES
Pain Service  Request to interrogate intrathecal pump after MRI    30year old male with history of paraplegia secondary to spinal cord injury and depression admitted for evaluation of lethargy/seizures - managed by Neurology.      Intrathecal Pump interr

## 2019-04-13 VITALS
WEIGHT: 170.31 LBS | TEMPERATURE: 98 F | SYSTOLIC BLOOD PRESSURE: 112 MMHG | HEIGHT: 70 IN | BODY MASS INDEX: 24.38 KG/M2 | OXYGEN SATURATION: 100 % | DIASTOLIC BLOOD PRESSURE: 55 MMHG | RESPIRATION RATE: 14 BRPM | HEART RATE: 92 BPM

## 2019-04-13 PROCEDURE — 99217 OBSERVATION CARE DISCHARGE: CPT | Performed by: HOSPITALIST

## 2019-04-13 PROCEDURE — 99226 SUBSEQUENT OBSERVATION CARE: CPT | Performed by: OTHER

## 2019-04-13 RX ORDER — PREGABALIN 300 MG/1
300 CAPSULE ORAL DAILY
Refills: 2 | Status: SHIPPED | COMMUNITY
Start: 2019-04-13

## 2019-04-13 RX ORDER — NALOXONE HYDROCHLORIDE 4 MG/.1ML
4 SPRAY NASAL ONCE AS NEEDED
Qty: 1 KIT | Refills: 0 | Status: SHIPPED | OUTPATIENT
Start: 2019-04-13 | End: 2019-04-13

## 2019-04-13 RX ORDER — LEVETIRACETAM 1000 MG/1
1000 TABLET ORAL 2 TIMES DAILY
Qty: 60 TABLET | Refills: 1 | Status: SHIPPED | OUTPATIENT
Start: 2019-04-13 | End: 2019-06-24

## 2019-04-13 RX ORDER — CEPHALEXIN 500 MG/1
500 CAPSULE ORAL 4 TIMES DAILY
Qty: 28 CAPSULE | Refills: 0 | Status: SHIPPED | OUTPATIENT
Start: 2019-04-14 | End: 2019-04-21

## 2019-04-13 RX ORDER — CEPHALEXIN 500 MG/1
500 CAPSULE ORAL 4 TIMES DAILY
Qty: 28 CAPSULE | Refills: 0 | Status: SHIPPED | OUTPATIENT
Start: 2019-04-14 | End: 2019-04-13

## 2019-04-13 RX ORDER — LEVETIRACETAM 500 MG/1
1000 TABLET ORAL 2 TIMES DAILY
Status: DISCONTINUED | OUTPATIENT
Start: 2019-04-13 | End: 2019-04-13

## 2019-04-13 RX ORDER — LEVETIRACETAM 250 MG/1
250 TABLET ORAL ONCE
Status: COMPLETED | OUTPATIENT
Start: 2019-04-13 | End: 2019-04-13

## 2019-04-13 NOTE — PLAN OF CARE
Problem: NEUROLOGICAL - ADULT  Goal: Achieves stable or improved neurological status  Description  INTERVENTIONS  - Assess for and report changes in neurological status  - Initiate measures to prevent increased intracranial pressure  - Maintain blood pre

## 2019-04-13 NOTE — PROGRESS NOTES
[id number] [facility] Progress Note    Mary Dill Patient Status:  Observation    2/15/1995 MRN [id number]   [facility] 3NE-A Attending Emmanuel Vidal MD   Hosp Day # 0 PCP Rafy Brown DO         Subjective:  April Melendez levETIRAcetam  1,000 mg Oral BID   • Oxybutynin Chloride  7.5 mg Oral Daily   • docusate sodium  100 mg Oral BID   • Senna  8.6 mg Oral BID   • PEG 3350  17 g Oral Daily   • cefTRIAXone  1 g Intravenous Q24H   • Potassium Chloride ER  60 mEq Oral Once   • CREATSERUM 0.47 (L) 02/12/2018       CBC:    Lab Results   Component Value Date    WBC 6.3 04/11/2019    WBC 6.8 02/12/2018    WBC 9.3 06/17/2017     Lab Results   Component Value Date    HEMOGLOBIN 13.3 05/24/2018    HGB 14.2 04/11/2019    HGB 12.9 (L) 02 Neurology  [facility]  Pager [phone]  4/13/2019

## 2019-04-13 NOTE — PROGRESS NOTES
[facility]ITAL[facility]  Progress Note     Ashley Espinosa Patient Status:  Observation    2/15/1995 MRN [id number]   [facility] 3NE-A Attending Arnaldo Cleveland MD   Hosp Day # 0 PCP Xiao Chamberlain DO     Chief Complaint: Seizure    S: Candor Jer mg Oral BID   • Senna  8.6 mg Oral BID   • PEG 3350  17 g Oral Daily   • cefTRIAXone  1 g Intravenous Q24H   • Potassium Chloride ER  60 mEq Oral Once   • amphetamine-dextroamphetamine  30 mg Oral BID   • morphine   Intrathecal Daily   • bisacodyl  10 mg R

## 2019-04-13 NOTE — PROGRESS NOTES
NURSING DISCHARGE NOTE    Discharged Home via . Accompanied by Family member  Belongings Taken by patient/family. Discharged paperwork reviewed with pt and father.   All question answered  IV removed  All belongings taken with pt  Pt left in s

## 2019-04-13 NOTE — PLAN OF CARE
Pt is A&Ox4 Room Air  Medication was given per [facility]  Pt  straight caths self.   Pt able to transfer to wheelchair independently  Pt vision impairment, glasses at bedside  Paraplegia BLE  KEPPRA changed to PO  Neuro ok with d/c if pt tolerate meds   Will cont Telephone Encounter by Jyothi Falcon at 10/01/18 10:24 AM     Author:  Jyothi Falcon Service:  (none) Author Type:  Technician     Filed:  10/01/18 10:24 AM Encounter Date:  10/1/2018 Status:  Signed     :  Jyothi Falcon (Technician)            Routed to Dr. Hewitt[IA1.1M]       Revision History        User Key Date/Time User Provider Type Action    > IA1.1 10/01/18 10:24 AM Jyothi Falcon Technician Sign    M - Manual

## 2019-04-13 NOTE — DISCHARGE SUMMARY
[facility] [place] HOSPITALIST  DISCHARGE SUMMARY     Feliz White Patient Status:  Observation    2/15/1995 MRN [id number]   [facility] 3NE-A Attending Pierre Lamar MD   Hosp Day # 0 PCP Melvin Oconnor DO     Date of Admission: 2019  Tan total) by mouth 4 (four) times daily for 7 days.    Stop taking on:  4/21/2019  Quantity:  28 capsule  Refills:  0        CHANGE how you take these medications      Instructions Prescription details   LYRICA 300 MG Caps  Generic drug:  pregabalin  What torres Tabs  Commonly known as:  ANTIVERT      Take 12.5-25 mg by mouth 3 (three) times daily as needed for Dizziness. Refills:  0     morphINE Sulfate (PF) in Sodium Chloride IT infusion      0.85 mg by Intrathecal route daily.    Refills:  0     NON FORMULARY

## 2019-04-15 ENCOUNTER — PATIENT OUTREACH (OUTPATIENT)
Dept: CASE MANAGEMENT | Age: 24
End: 2019-04-15

## 2019-05-07 RX ORDER — LEVETIRACETAM 1000 MG/1
TABLET ORAL
Qty: 60 TABLET | Refills: 0 | OUTPATIENT
Start: 2019-05-07

## 2019-05-07 NOTE — TELEPHONE ENCOUNTER
Medication: Keppra    Date of last refill: 4/13/19 (#60/1)  Pharmacy informed they should have one refill on file as above. Pharmacist, Kris Noyola, states she can not find it. Verbal for Keppra 1000 mg BID #60 no additional refill given.   Future Appointments

## 2019-05-13 RX ORDER — OXYBUTYNIN CHLORIDE 5 MG/1
TABLET ORAL
Qty: 405 TABLET | Refills: 0 | OUTPATIENT
Start: 2019-05-13

## 2019-05-21 ENCOUNTER — TELEPHONE (OUTPATIENT)
Dept: NEUROLOGY | Facility: CLINIC | Age: 24
End: 2019-05-21

## 2019-05-21 NOTE — TELEPHONE ENCOUNTER
Prescription verification form filled out and signed by Dr Leticia Del Cid and faxed to [address] [place] [address]. Sent to scan dept.

## 2019-06-21 DIAGNOSIS — G40.901 SEIZURE DISORDER, STATUS EPILEPTICUS, NONCONVULSIVE (HCC): Primary | ICD-10-CM

## 2019-06-21 NOTE — TELEPHONE ENCOUNTER
Medication prescribed at d/c from IP stay. Pt has upcoming follow up appt. Rx pended to cover patient until appt.     Medication: levETIRAcetam 1000 MG Oral Tab     Date of last refill: 4/13/2019  (#60/1)  [place]

## 2019-06-24 DIAGNOSIS — G40.901 SEIZURE DISORDER, STATUS EPILEPTICUS, NONCONVULSIVE (HCC): Primary | ICD-10-CM

## 2019-06-24 NOTE — TELEPHONE ENCOUNTER
Medication: LEVETIRACETAM 1000 MG Oral Tab    Date of last refill: 04/13/19 (#60/1)  [place]    Last office visit: 10/03/18  Due back to clinic per last office note:  Around 12/03/18  Date next office visit scheduled

## 2019-06-25 RX ORDER — LEVETIRACETAM 1000 MG/1
TABLET ORAL
Qty: 60 TABLET | Refills: 0 | Status: SHIPPED | OUTPATIENT
Start: 2019-06-25 | End: 2019-07-17 | Stop reason: ALTCHOICE

## 2019-06-30 RX ORDER — LEVETIRACETAM 1000 MG/1
1000 TABLET ORAL 2 TIMES DAILY
Qty: 60 TABLET | Refills: 0 | Status: SHIPPED | OUTPATIENT
Start: 2019-06-30 | End: 2020-01-08 | Stop reason: ALTCHOICE

## 2019-07-13 DIAGNOSIS — J45.30 MILD PERSISTENT ASTHMA WITHOUT COMPLICATION: ICD-10-CM

## 2019-07-15 ENCOUNTER — TELEPHONE (OUTPATIENT)
Dept: FAMILY MEDICINE CLINIC | Facility: CLINIC | Age: 24
End: 2019-07-15

## 2019-07-16 RX ORDER — ALBUTEROL SULFATE 90 UG/1
2 AEROSOL, METERED RESPIRATORY (INHALATION) EVERY 4 HOURS PRN
Qty: 1 INHALER | Refills: 0 | Status: SHIPPED | OUTPATIENT
Start: 2019-07-16 | End: 2019-08-28

## 2019-07-16 NOTE — TELEPHONE ENCOUNTER
LOV 10/3/2018 patient was due to follow up in 6 months (4/2019). No scheduled appointments.     Refill approved x1 month and message left for patient and also sent with Rx with reminder to schedule for further refills

## 2019-07-17 ENCOUNTER — TELEPHONE (OUTPATIENT)
Dept: FAMILY MEDICINE CLINIC | Facility: CLINIC | Age: 24
End: 2019-07-17

## 2019-07-17 ENCOUNTER — OFFICE VISIT (OUTPATIENT)
Dept: NEUROLOGY | Facility: CLINIC | Age: 24
End: 2019-07-17
Payer: MEDICARE

## 2019-07-17 VITALS — SYSTOLIC BLOOD PRESSURE: 110 MMHG | DIASTOLIC BLOOD PRESSURE: 70 MMHG | HEART RATE: 80 BPM

## 2019-07-17 DIAGNOSIS — Z87.828 HISTORY OF SPINAL CORD INJURY: ICD-10-CM

## 2019-07-17 DIAGNOSIS — G40.109 SYMPTOMATIC LOCALIZATION-RELATED EPILEPSY (HCC): Primary | ICD-10-CM

## 2019-07-17 DIAGNOSIS — G89.21 CHRONIC PAIN DUE TO TRAUMA: ICD-10-CM

## 2019-07-17 PROCEDURE — 1111F DSCHRG MED/CURRENT MED MERGE: CPT | Performed by: OTHER

## 2019-07-17 PROCEDURE — 99214 OFFICE O/P EST MOD 30 MIN: CPT | Performed by: OTHER

## 2019-07-17 RX ORDER — MELATONIN
1000 DAILY
COMMUNITY

## 2019-07-17 NOTE — TELEPHONE ENCOUNTER
Please find out if patient has enough supplies to last until he can get in to see me for a follow-up office visit. Please let me know either way so if needed the last progress note can be addended.

## 2019-07-17 NOTE — PATIENT INSTRUCTIONS
After your visit at the [facility] office  today,  please direct any follow up questions or medication needs to the staff in our  [place] office so that your concerns may be promptly addressed.   We are available through [employer] or at the numbers below: must be picked up in office. • Please allow the office 2-3 business days to fill the prescription. • Patient must present photo ID at time of . PLEASE NOTE: PRESCRIPTIONS MUST BE PICKED UP PRIOR TO 3:00PM MONDAY-FRIDAY    Scheduling Tests:     If submitting forms to office staff. • Form completion may require an additional fee. • A signed Release of Information (DAVE) must be on file before forms may be submitted. When dropping off forms, please ask the  for this paper.    • Failure

## 2019-07-17 NOTE — TELEPHONE ENCOUNTER
Dr. Benji Carmona,    [facility] is needing an order renewal for straight cath supplies for the patient. Mother states he caths 6 times daily.   In addition, medicare guidelines require his progress notes to include information regarding straight cath & norma

## 2019-07-17 NOTE — PROGRESS NOTES
[place] [address]   Neurology- follow up    Cheryl Burr Patient Status:  No patient class for patient encounter    2/15/1995 MRN [id number]   [address] PCP Jovana Herr DO dog.  Interim  Since last visit, he had another episode of lethargy and impaired processing. He was brought to ED, and EEG showed 3 Hz spike and wave activity. He was started on Keppra IV in the hospital, and his mental status improved.  Before the episode, sister; Asthma in his sister; HTN in his father and paternal grandfather;  Thyroid Disorder in his maternal grandfather; colon cancer in his maternal grandfather; diabetes mellitus in his paternal grandmother; hyperlipidemia in his father; stroke syndrome i mg total) by mouth daily. , Disp: , Rfl: 2  •  CUSTOM MEDICATION, Meloxicam 0.09%, Gabapentin 2.5%, Lidocaine 2.15%, Prilocaine 2.15% TC Apply 4 grams 3-4x per day for treatment of pain.  Dispense #480 grams for 30 day supply, Disp: , Rfl: 11  •  OXYBUTYNIN alert, attentive, and oriented x 3. Language is coherent and fluent without aphasia. Memory, comprehension and ability to follow commands were intact. Cranial nerves II-XII: Optic discs were sharp. Pupils were round and reacted to light.  Extraocular move DO  Neuromuscular and General Neurology  [facility]

## 2019-07-18 DIAGNOSIS — G40.901 SEIZURE DISORDER, STATUS EPILEPTICUS, NONCONVULSIVE (HCC): ICD-10-CM

## 2019-07-18 RX ORDER — LEVETIRACETAM 1000 MG/1
TABLET ORAL
Qty: 60 TABLET | Refills: 2 | Status: SHIPPED | OUTPATIENT
Start: 2019-07-18 | End: 2019-08-08

## 2019-07-18 NOTE — TELEPHONE ENCOUNTER
Medication: LEVETIRACETAM 1000 MG Oral Tab    Date of last refill: 06/30/19 (#60/0)  [place]    Last office visit: 07/17/19  Due back to clinic per last office note:  Around 10/17/19  Date next office visit scheduled

## 2019-08-08 ENCOUNTER — OFFICE VISIT (OUTPATIENT)
Dept: FAMILY MEDICINE CLINIC | Facility: CLINIC | Age: 24
End: 2019-08-08
Payer: MEDICARE

## 2019-08-08 VITALS — SYSTOLIC BLOOD PRESSURE: 118 MMHG | DIASTOLIC BLOOD PRESSURE: 62 MMHG | HEART RATE: 92 BPM

## 2019-08-08 DIAGNOSIS — G82.20 PARALYSIS OF BOTH LOWER LIMBS (HCC): ICD-10-CM

## 2019-08-08 DIAGNOSIS — Z00.00 MEDICARE ANNUAL WELLNESS VISIT, INITIAL: Primary | ICD-10-CM

## 2019-08-08 DIAGNOSIS — F39 MOOD DISORDER (HCC): ICD-10-CM

## 2019-08-08 DIAGNOSIS — J45.40 MODERATE PERSISTENT ASTHMA WITHOUT COMPLICATION: ICD-10-CM

## 2019-08-08 DIAGNOSIS — Z87.828 HISTORY OF SPINAL CORD INJURY: ICD-10-CM

## 2019-08-08 DIAGNOSIS — R73.9 HYPERGLYCEMIA: ICD-10-CM

## 2019-08-08 DIAGNOSIS — Z13.6 SCREENING, ISCHEMIC HEART DISEASE: ICD-10-CM

## 2019-08-08 DIAGNOSIS — N31.9 NEUROGENIC BLADDER: ICD-10-CM

## 2019-08-08 PROCEDURE — G0402 INITIAL PREVENTIVE EXAM: HCPCS | Performed by: FAMILY MEDICINE

## 2019-08-08 PROCEDURE — 99214 OFFICE O/P EST MOD 30 MIN: CPT | Performed by: FAMILY MEDICINE

## 2019-08-08 NOTE — PROGRESS NOTES
HPI:   Garry Beatty is a 30year old male who presents for a Medicare Initial Annual Wellness visit (Once after 12 month Medicare anniversary) . Numbness:  4th and 5th fingers and part of hand on both sides for about 2 weeks. Constant. Meds based on screening of functional status. Are you able to afford your medications?: No     He has Vision problems based on screening of functional status. Vision Problems? : Yes   He has Walking problems based on screening of functional status.    [facility])     History of spinal cord injury     Muscle spasm     Spastic neurogenic bladder     Neurogenic bladder     Anal pain     Prolapsed internal hemorrhoids, grade 3     Coccygodynia     Attention deficit hyperactivity disorder (ADHD), predominantly cortes total) by mouth daily. With 60=80mg total daily   amphetamine-dextroamphetamine (ADDERALL) 30 MG Oral Tab Take 1 tablet (30 mg total) by mouth 2 (two) times daily. pregabalin (LYRICA) 300 MG Oral Cap Take 1 capsule (300 mg total) by mouth daily.    CUSTOM history; other surgical history (12/2015); and cath inferior vena cava filter insertion (2012). His family history includes Allergies in his sister; Asthma in his sister; HTN in his father and paternal grandfather;  Thyroid Disorder in his maternal grand no tenderness/mass/nodules       Lungs:   Clear to auscultation bilaterally, respirations unlabored       Heart:  Regular rate and rhythm, S1, S2 normal, no murmur, rub or gallop   Abdomen:   Soft, non-tender, no masses or organomegaly initiated initial  Patient provided handouts on men's health and prevention, healthcare power of , and living well. 2. Screening, ischemic heart disease  - LIPID PANEL    3. Mood disorder [facility])  Follow-up with psychiatrist and counselor as planned.   Labs energy level?: Daily Walks  How would you describe your daily physical activity?: Light  How would you describe your current health state?: Poor  How do you maintain positive mental well-being?: Games    This section provided for quick review of chart, sep renal disease   Hemophiliacs who received Factor VIII or IX concentrates   Clients of institutions for the mentally retarded   Persons who live in the same house as a HepB virus carrier   Homosexual men   Illicit injectable drug abusers     Tetanus Toxoid

## 2019-08-08 NOTE — PATIENT INSTRUCTIONS
Riaz Davies's SCREENING SCHEDULE   Tests on this list are recommended by your physician but may not be covered, or covered at this frequency, by your insurer. Please check with your insurance carrier before scheduling to verify coverage.     iConclude patient. Update Health Maintenance if applicable    Flex Sigmoidoscopy Screen  Covered every 5 years No results found for this or any previous visit. No flowsheet data found.      Fecal Occult Blood   Covered Annually No results found for: FOB, OCCULTSTOOL your prescription benefits, but Medicare does not cover unless Medically needed    Zoster (Not covered by Medicare Part B) No orders found for this or any previous visit.  This may be covered with your pharmacy  prescription benefits     Recommended Website Medicare covers annually or at 6-month intervals for prediabetic patients        Cardiovascular Disease Screening     Cholesterol, covered every 5 yrs including Total, LDL and Trigs No results found for: LDL, LDLC, CHOLEST, TRIG     EKG - covered if needed performed in visit on 09/18/12   • INFLUENZA VIRUS VACCINE, PRESERV FREE, >=1YEARS OF AGE    Please get every year    Pneumococcal 13 (Prevnar)  Covered Once after 65 No orders found for this or any previous visit.  Please get once after your 65th birthday

## 2019-08-14 ENCOUNTER — TELEPHONE (OUTPATIENT)
Dept: FAMILY MEDICINE CLINIC | Facility: CLINIC | Age: 24
End: 2019-08-14

## 2019-08-14 NOTE — TELEPHONE ENCOUNTER
Faxed catheter supply order to [address] [facility] fax [phone]. Phone [phone].

## 2019-08-17 DIAGNOSIS — G40.901 SEIZURE DISORDER, STATUS EPILEPTICUS, NONCONVULSIVE (HCC): ICD-10-CM

## 2019-08-19 RX ORDER — LEVETIRACETAM 1000 MG/1
TABLET ORAL
Qty: 60 TABLET | Refills: 2 | OUTPATIENT
Start: 2019-08-19

## 2019-08-24 PROBLEM — R41.82 ALTERED MENTAL STATUS: Status: RESOLVED | Noted: 2019-04-11 | Resolved: 2019-08-24

## 2019-08-24 PROBLEM — J45.30 MILD PERSISTENT ASTHMA WITHOUT COMPLICATION: Status: RESOLVED | Noted: 2018-09-05 | Resolved: 2019-08-24

## 2019-08-24 PROBLEM — R53.83 LETHARGY: Status: RESOLVED | Noted: 2018-09-05 | Resolved: 2019-08-24

## 2019-08-24 PROBLEM — F39 MOOD DISORDER (HCC): Status: ACTIVE | Noted: 2019-08-24

## 2019-08-24 PROBLEM — R41.82 ALTERED MENTAL STATUS, UNSPECIFIED ALTERED MENTAL STATUS TYPE: Status: RESOLVED | Noted: 2019-04-11 | Resolved: 2019-08-24

## 2019-08-24 PROBLEM — R73.9 HYPERGLYCEMIA: Status: ACTIVE | Noted: 2019-08-24

## 2019-08-24 PROBLEM — J45.30 MILD PERSISTENT ASTHMA WITHOUT COMPLICATION (HCC): Status: RESOLVED | Noted: 2018-09-05 | Resolved: 2019-08-24

## 2019-08-24 PROBLEM — F39 MOOD DISORDER: Status: ACTIVE | Noted: 2019-08-24

## 2019-08-24 PROBLEM — R41.0 CONFUSION: Status: RESOLVED | Noted: 2018-09-05 | Resolved: 2019-08-24

## 2019-08-28 DIAGNOSIS — J45.30 MILD PERSISTENT ASTHMA WITHOUT COMPLICATION: Primary | ICD-10-CM

## 2019-08-29 RX ORDER — ALBUTEROL SULFATE 90 UG/1
AEROSOL, METERED RESPIRATORY (INHALATION)
Qty: 8.5 INHALER | Refills: 0 | Status: SHIPPED | OUTPATIENT
Start: 2019-08-29 | End: 2020-01-08

## 2019-08-29 NOTE — TELEPHONE ENCOUNTER
Pt requesting refill of ALBUTEROL SULFATE  (90 Base) MCG/ACT Inhalation  passed protocol , refill approved, sent to pharmacy:     Last Time Medication was Filled:  7/16/19    Last Office Visit with PCP:   Return in about 6 weeks (around 9/19/2019)

## 2019-08-31 DIAGNOSIS — N31.9 NEUROGENIC BLADDER: Primary | ICD-10-CM

## 2019-09-03 RX ORDER — OXYBUTYNIN CHLORIDE 5 MG/1
TABLET ORAL
Qty: 405 TABLET | Refills: 0 | Status: SHIPPED | OUTPATIENT
Start: 2019-09-03 | End: 2020-05-08

## 2019-09-03 NOTE — TELEPHONE ENCOUNTER
Pt requesting refill of OXYBUTYNIN CHLORIDE 5 MG Oral Tab, refill approved, sent to pharmacy:     Last Time Medication was Filled:  9/24/18     Last Office Visit with PCP: 8/8/19. Return in about 6 weeks (around 9/19/2019        No future appointments.
Never smoker

## 2019-10-09 ENCOUNTER — IMMUNIZATION (OUTPATIENT)
Dept: FAMILY MEDICINE CLINIC | Facility: CLINIC | Age: 24
End: 2019-10-09
Payer: MEDICARE

## 2019-10-09 ENCOUNTER — OFFICE VISIT (OUTPATIENT)
Dept: NEUROLOGY | Facility: CLINIC | Age: 24
End: 2019-10-09
Payer: MEDICARE

## 2019-10-09 VITALS — HEART RATE: 78 BPM | DIASTOLIC BLOOD PRESSURE: 74 MMHG | RESPIRATION RATE: 14 BRPM | SYSTOLIC BLOOD PRESSURE: 114 MMHG

## 2019-10-09 DIAGNOSIS — Z87.828 HISTORY OF SPINAL CORD INJURY: ICD-10-CM

## 2019-10-09 DIAGNOSIS — G40.109 SYMPTOMATIC LOCALIZATION-RELATED EPILEPSY (HCC): Primary | ICD-10-CM

## 2019-10-09 DIAGNOSIS — G56.23 ULNAR NEUROPATHY OF BOTH UPPER EXTREMITIES: ICD-10-CM

## 2019-10-09 DIAGNOSIS — Z23 NEED FOR VACCINATION: ICD-10-CM

## 2019-10-09 PROCEDURE — 90686 IIV4 VACC NO PRSV 0.5 ML IM: CPT | Performed by: FAMILY MEDICINE

## 2019-10-09 PROCEDURE — G0008 ADMIN INFLUENZA VIRUS VAC: HCPCS | Performed by: FAMILY MEDICINE

## 2019-10-09 PROCEDURE — 99213 OFFICE O/P EST LOW 20 MIN: CPT | Performed by: OTHER

## 2019-10-09 NOTE — PATIENT INSTRUCTIONS
After your visit at the [facility] office  today,  please direct any follow up questions or medication needs to the staff in our  [place] office so that your concerns may be promptly addressed.   We are available through [employer] or at the numbers below: must be picked up in office. • Please allow the office 2-3 business days to fill the prescription. • Patient must present photo ID at time of . PLEASE NOTE: PRESCRIPTIONS MUST BE PICKED UP PRIOR TO 3:00PM MONDAY-FRIDAY    Scheduling Tests:     If submitting forms to office staff. • Form completion may require an additional fee. • A signed Release of Information (DAVE) must be on file before forms may be submitted. When dropping off forms, please ask the  for this paper.    • Failure

## 2019-10-09 NOTE — PROGRESS NOTES
[place] [address]   Neurology- follow up    Fiona Hardwick Patient Status:  No patient class for patient encounter    2/15/1995 MRN [id number]   [facility] Earl Bazan,  dog.  Interim  Since last visit, he had another episode of lethargy and impaired processing. He was brought to ED, and EEG showed 3 Hz spike and wave activity. He was started on Keppra IV in the hospital, and his mental status improved.  Before the episode, Surgical History:  Past Surgical History:   Procedure Laterality Date   • CATH INFERIOR VENA CAVA FILTER INSERTION  2012   • ESOPHAGOGASTRODUODENOSCOPY (EGD) N/A 4/6/2017    Performed by Kamini Kennedy MD at [facility] ENDOSCOPY   • LAMINECTOMY,>2 TAHMINA ANAMARIA [facility] puff into the lungs daily. , Disp: 3 each, Rfl: 1  •  Vitamin D3 1000 units Oral Tab, Take 1,000 Units by mouth daily. , Disp: , Rfl:   •  levetiracetam 1000 MG Oral Tab, Take 1 tablet (1,000 mg total) by mouth 2 (two) times daily. , Disp: 60 tablet, Rfl: 0 is no scoliosis, or joint deformities  Neurologic examination:  Mental status: Patient is alert, attentive, and oriented x 3. Language is coherent and fluent without aphasia. Memory, comprehension and ability to follow commands were intact.    Cranial nerve prognosis, treatment. The patient was given ample opportunity to ask questions. All questions and concerns were addressed.  This is a 25 minute visit and greater than 50% of the time was spent counseling the patient and/or coordinating care, and/or reviewin

## 2019-10-26 DIAGNOSIS — G40.901 SEIZURE DISORDER, STATUS EPILEPTICUS, NONCONVULSIVE (HCC): ICD-10-CM

## 2019-10-28 RX ORDER — LEVETIRACETAM 1000 MG/1
TABLET ORAL
Qty: 60 TABLET | Refills: 2 | Status: SHIPPED | OUTPATIENT
Start: 2019-10-28 | End: 2020-02-26

## 2019-10-28 NOTE — TELEPHONE ENCOUNTER
Medication: LEVETIRACETAM 1000 MG Oral Tab    Date of last refill: 06/30/19 (#60/0)  [place]    Last office visit: 10/09/19  Due back to clinic per last office note:  Around 03/09/20  Date next office visit scheduled

## 2020-01-07 DIAGNOSIS — J45.30 MILD PERSISTENT ASTHMA WITHOUT COMPLICATION: ICD-10-CM

## 2020-01-07 RX ORDER — ALBUTEROL SULFATE 90 UG/1
AEROSOL, METERED RESPIRATORY (INHALATION)
Qty: 8.5 INHALER | Refills: 0 | OUTPATIENT
Start: 2020-01-07

## 2020-01-07 NOTE — TELEPHONE ENCOUNTER
Patient requesting refill of albuterol inhaler. States since winter is requiring it more. Informed him of last visit notes in August:    7. Moderate persistent asthma without complication  Uncontrolled. Needs improvement.   Increase strength of Breo Andressa

## 2020-01-08 ENCOUNTER — OFFICE VISIT (OUTPATIENT)
Dept: NEUROLOGY | Facility: CLINIC | Age: 25
End: 2020-01-08
Payer: MEDICARE

## 2020-01-08 ENCOUNTER — TELEPHONE (OUTPATIENT)
Dept: NEUROLOGY | Facility: CLINIC | Age: 25
End: 2020-01-08

## 2020-01-08 VITALS — DIASTOLIC BLOOD PRESSURE: 72 MMHG | SYSTOLIC BLOOD PRESSURE: 116 MMHG | RESPIRATION RATE: 14 BRPM | HEART RATE: 100 BPM

## 2020-01-08 DIAGNOSIS — R46.89 EPISODE OF ABNORMAL BEHAVIOR: ICD-10-CM

## 2020-01-08 DIAGNOSIS — Z87.828 HISTORY OF SPINAL CORD INJURY: ICD-10-CM

## 2020-01-08 DIAGNOSIS — G89.21 CHRONIC PAIN DUE TO TRAUMA: ICD-10-CM

## 2020-01-08 DIAGNOSIS — G40.109 SYMPTOMATIC LOCALIZATION-RELATED EPILEPSY (HCC): Primary | ICD-10-CM

## 2020-01-08 PROCEDURE — 99214 OFFICE O/P EST MOD 30 MIN: CPT | Performed by: OTHER

## 2020-01-08 RX ORDER — ALBUTEROL SULFATE 90 UG/1
AEROSOL, METERED RESPIRATORY (INHALATION)
Qty: 8.5 INHALER | Refills: 0 | Status: SHIPPED | OUTPATIENT
Start: 2020-01-08 | End: 2020-05-08

## 2020-01-08 NOTE — PROGRESS NOTES
[facility]   Neurology- follow up    Fiona Hardwick Patient Status:  No patient class for patient encounter    2/15/1995 MRN [id number]   Location [address] PCP Earl Bazan DO dog.  Interim  Since last visit, he had another episode of lethargy and impaired processing. He was brought to ED, and EEG showed 3 Hz spike and wave activity. He was started on Keppra IV in the hospital, and his mental status improved.  Before the episode, Medical History:   Diagnosis Date   • Altered mental status, unspecified altered mental status type 4/11/2019   • Asthma    • Major depressive disorder, single episode in full remission ([facility] 9/5/2012   • Seizure disorder, status epilepticus, nonconvulsive ARIPiprazole 2 MG Oral Tab, Take 1 tablet (2 mg total) by mouth daily. , Disp: 30 tablet, Rfl: 2  •  DULOXETINE HCL 60 MG Oral Cap DR Particles, TAKE 1 CAPSULE (60 MG TOTAL) BY MOUTH ONCE DAILY.  WITH 20MG = 80MG DAILY, Disp: 90 capsule, Rfl: 0  •  LEVETIRAC •  DULoxetine HCl 60 MG Oral Cap DR Particles, 60 mg., Disp: , Rfl:   •  DULoxetine HCl 20 MG Oral Cap DR Particles, 20 mg., Disp: , Rfl:   •  levetiracetam 1000 MG Oral Tab, 1,000 mg., Disp: , Rfl:   •  Oxybutynin Chloride 5 MG Oral Tab, 7.5 mg., Disp: revealed MRC grade 5/5 strength in arms, 0/5 in legs  Deep tendon reflexes were 2 at the biceps, brachioradialis, triceps, 0+ knee jerk, and 0+ ankle jerk. Low tone in LE's. Plantar responses were flexor bilaterally.    Sensory exam revealed normal light to of seizures, what to do during seizure episode.       Sabine Aaron, DO  Neuromuscular and General Neurology  [employer]

## 2020-01-08 NOTE — PATIENT INSTRUCTIONS
After your visit at the [facility] office  today,  please direct any follow up questions or medication needs to the staff in our  [place] office so that your concerns may be promptly addressed.   We are available through [employer] or at the numbers below: must be picked up in office. • Please allow the office 2-3 business days to fill the prescription. • Patient must present photo ID at time of . PLEASE NOTE: PRESCRIPTIONS MUST BE PICKED UP PRIOR TO 3:00PM MONDAY-FRIDAY    Scheduling Tests:     If submitting forms to office staff. • Form completion may require an additional fee. • A signed Release of Information (DAVE) must be on file before forms may be submitted. When dropping off forms, please ask the  for this paper.    • Failure

## 2020-01-08 NOTE — TELEPHONE ENCOUNTER
Pt in office today for follow up with Dr. Elpidio Fields. Per Dr. Elpidio Fields, to obtain records. DAVE signed by pt, faxed to [facility]. Confirmation received. Sent for scanning.

## 2020-01-16 ENCOUNTER — TELEPHONE (OUTPATIENT)
Dept: NEUROLOGY | Facility: CLINIC | Age: 25
End: 2020-01-16

## 2020-02-07 ENCOUNTER — TELEPHONE (OUTPATIENT)
Dept: NEUROLOGY | Facility: CLINIC | Age: 25
End: 2020-02-07

## 2020-02-26 DIAGNOSIS — G40.901 SEIZURE DISORDER, STATUS EPILEPTICUS, NONCONVULSIVE (HCC): ICD-10-CM

## 2020-02-26 RX ORDER — LEVETIRACETAM 1000 MG/1
TABLET ORAL
Qty: 180 TABLET | Refills: 0 | Status: SHIPPED | OUTPATIENT
Start: 2020-02-26 | End: 2020-05-11

## 2020-02-26 NOTE — TELEPHONE ENCOUNTER
Medication: LEVETIRACETAM 1000 MG Oral Tab    Date of last refill: 10/28/2019 (#60/2)  [place]    Last office visit: 1/8/2020  Due back to clinic per last office note:  Around 05/08/2020  Date next office visit sched

## 2020-05-08 ENCOUNTER — VIRTUAL PHONE E/M (OUTPATIENT)
Dept: FAMILY MEDICINE CLINIC | Facility: CLINIC | Age: 25
End: 2020-05-08
Payer: MEDICARE

## 2020-05-08 DIAGNOSIS — N31.9 NEUROGENIC BLADDER: ICD-10-CM

## 2020-05-08 DIAGNOSIS — J45.40 MODERATE PERSISTENT ASTHMA WITHOUT COMPLICATION: Primary | ICD-10-CM

## 2020-05-08 DIAGNOSIS — G82.20 PARALYSIS OF BOTH LOWER LIMBS (HCC): ICD-10-CM

## 2020-05-08 DIAGNOSIS — Z87.828 HISTORY OF SPINAL CORD INJURY: ICD-10-CM

## 2020-05-08 DIAGNOSIS — Z79.899 ENCOUNTER FOR LONG-TERM CURRENT USE OF MEDICATION: ICD-10-CM

## 2020-05-08 PROCEDURE — 99442 PHONE E/M BY PHYS 11-20 MIN: CPT | Performed by: FAMILY MEDICINE

## 2020-05-08 RX ORDER — OXYBUTYNIN CHLORIDE 5 MG/1
TABLET ORAL
Qty: 405 TABLET | Refills: 3 | Status: SHIPPED | OUTPATIENT
Start: 2020-05-08 | End: 2021-02-03

## 2020-05-08 RX ORDER — DEXTROAMPHETAMINE SULFATE, DEXTROAMPHETAMINE SACCHARATE, AMPHETAMINE SULFATE AND AMPHETAMINE ASPARTATE 6.25; 6.25; 6.25; 6.25 MG/1; MG/1; MG/1; MG/1
25 CAPSULE, EXTENDED RELEASE ORAL EVERY MORNING
COMMUNITY
Start: 2020-04-20

## 2020-05-08 RX ORDER — ALBUTEROL SULFATE 90 UG/1
AEROSOL, METERED RESPIRATORY (INHALATION)
Qty: 18 INHALER | Refills: 1 | Status: SHIPPED | OUTPATIENT
Start: 2020-05-08 | End: 2020-10-23

## 2020-05-08 NOTE — PROGRESS NOTES
Virtual/Telephone Check-In    America Gallegos verbally consents to a Virtual/Telephone Check-In service on 05/08/20. Patient understands and accepts financial responsibility for any deductible, co-insurance and/or co-pays associated with this service. relief of bladder symptoms. Current Outpatient Medications   Medication Sig Dispense Refill   • ADDERALL XR 25 MG Oral Capsule SR 24 Hr Take 25 mg by mouth every morning.      • Fluticasone Furoate-Vilanterol (BREO ELLIPTA) 200-25 MCG/INH Inhalation 2 patches onto the skin daily. As needed   0   • Bisacodyl 10 MG Rectal Suppos Place 10 mg rectally every other day.           Patient Active Problem List:     Heartburn     Unspecified constipation     Abnormal involuntary movements(781.0)     Paralysis of vision. HEENT: Denies sore throat or other upper respiratory symptoms. LUNGS: Denies progressive cough or difficulty breathing. CARDIOVASCULAR: Denies chest pain or palpitations. GI: Denies nausea or vomiting or diarrhea. : As in HPI.   NEURO: Due fo Fluticasone Furoate-Vilanterol (BREO ELLIPTA) 200-25 MCG/INH Inhalation Aerosol Powder, Breath Activated; Inhale 1 puff into the lungs daily. Dispense: 3 each;  Refill: 3  - Albuterol Sulfate  (90 Base) MCG/ACT Inhalation Aero Soln; INHALE 2 PUFFS E

## 2020-05-11 ENCOUNTER — TELEMEDICINE (OUTPATIENT)
Dept: NEUROLOGY | Facility: CLINIC | Age: 25
End: 2020-05-11
Payer: MEDICARE

## 2020-05-11 ENCOUNTER — TELEPHONE (OUTPATIENT)
Dept: NEUROLOGY | Facility: CLINIC | Age: 25
End: 2020-05-11

## 2020-05-11 DIAGNOSIS — G40.901 SEIZURE DISORDER, STATUS EPILEPTICUS, NONCONVULSIVE (HCC): Primary | ICD-10-CM

## 2020-05-11 DIAGNOSIS — G40.109 SYMPTOMATIC LOCALIZATION-RELATED EPILEPSY (HCC): ICD-10-CM

## 2020-05-11 DIAGNOSIS — G47.8 SLEEP PARALYSIS: ICD-10-CM

## 2020-05-11 DIAGNOSIS — Z87.828 HISTORY OF SPINAL CORD INJURY: ICD-10-CM

## 2020-05-11 DIAGNOSIS — G89.21 CHRONIC PAIN DUE TO TRAUMA: ICD-10-CM

## 2020-05-11 DIAGNOSIS — G40.109 SYMPTOMATIC LOCALIZATION-RELATED EPILEPSY (HCC): Primary | ICD-10-CM

## 2020-05-11 PROCEDURE — 99213 OFFICE O/P EST LOW 20 MIN: CPT | Performed by: OTHER

## 2020-05-11 RX ORDER — LEVETIRACETAM 1000 MG/1
1000 TABLET ORAL 2 TIMES DAILY
Qty: 180 TABLET | Refills: 2 | Status: SHIPPED | OUTPATIENT
Start: 2020-05-11

## 2020-05-11 NOTE — TELEPHONE ENCOUNTER
Patient was to have video visit. Unable to set up since was using his mother's phone. Therefore, converted to phone visit. Phone visit within video visit encounter.

## 2020-05-11 NOTE — PROGRESS NOTES
Virtual Telephone Check-In     America Gallegos verbally consents to a Virtual/Telephone Check-In visit on 05/11/20.     Patient understands and accepts financial responsibility for any deductible, co-insurance and/or co-pays associated with this service. occurred he was having hallucinations of his dog. Interim  Since last visit, he had another episode of lethargy and impaired processing. He was brought to ED, and EEG showed 3 Hz spike and wave activity.  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 A   follow up study is needed.  Clinical correlation is advised.     CT head 8 tobacco. He reports current alcohol use. He reports current drug use.  Drug: Cannabis.     Allergies:     Seasonal                Runny nose     MEDICATIONS:     Current Outpatient Medications:   •  ADDERALL XR 25 MG Oral Capsule SR 24 Hr, Take 25 mg by reid hours as needed for Nausea.  , Disp: , Rfl: 0  •  baclofen 2 mcg/mL in sodium chloride 0.9 % for pain pump, by Intrathecal route daily.  Dosage unknown , Disp: , Rfl:   •  morphINE Sulfate (PF) in Sodium Chloride IT infusion, 0.85 mg by Intrathecal route da controlled on Keppra 1000mg BID  Continue Keppra 1000mg BID  Episode of sleep paralysis- no decreased sleep latency by history, sleep evaluation is recommended  Insomnia- ay be multifactorial, still vaping, recommended to wean, anxiety/depression, would di

## 2020-06-10 ENCOUNTER — VIRTUAL PHONE E/M (OUTPATIENT)
Dept: FAMILY MEDICINE CLINIC | Facility: CLINIC | Age: 25
End: 2020-06-10
Payer: MEDICARE

## 2020-06-10 DIAGNOSIS — Z79.899 ENCOUNTER FOR LONG-TERM CURRENT USE OF MEDICATION: ICD-10-CM

## 2020-06-10 DIAGNOSIS — F39 MOOD DISORDER (HCC): ICD-10-CM

## 2020-06-10 DIAGNOSIS — J45.40 MODERATE PERSISTENT ASTHMA WITHOUT COMPLICATION: Primary | ICD-10-CM

## 2020-06-10 PROCEDURE — 99213 OFFICE O/P EST LOW 20 MIN: CPT | Performed by: FAMILY MEDICINE

## 2020-06-10 NOTE — PROGRESS NOTES
Video visit with synchronous video and audio via [address] verbally consents to a Video visit with synchronous video and audio via [employer] on 06/10/20.   Patient has been referred to the [facility] website at [email or web address] to re Albuterol Sulfate  (90 Base) MCG/ACT Inhalation Aero Soln INHALE 2 PUFFS EVERY 4 HOURS AS NEEDED FOR WHEEZING/COUGH 18 Inhaler 1   • Oxybutynin Chloride 5 MG Oral Tab TAKE 1 AND 1/2 TABLETS BY MOUTH 3 TIMES A  tablet 3   • prednisoLONE acetat hemorrhoids, grade 3     Coccygodynia     Attention deficit hyperactivity disorder (ADHD), predominantly inattentive type     Episode of abnormal behavior     Seizure ([facility]     Fracture of thoracic vertebra, T1-T6 with complete cord lesion     Varicella No increased work of breathing. No cough. NEURO: Alert and oriented x3  PSYCH: Mildly flattish affect.       DATA:    Asthma Flowsheet ([facility]) 6/10/2020 5/8/2020 8/8/2019   ACT Score 24 13 9       ASSESSMENT AND PLAN:     Moderate persistent ast

## 2020-06-16 ENCOUNTER — TELEPHONE (OUTPATIENT)
Dept: FAMILY MEDICINE CLINIC | Facility: CLINIC | Age: 25
End: 2020-06-16

## 2020-06-16 NOTE — TELEPHONE ENCOUNTER
I received a form regarding patients catheter supply, Dr review and signed.  I sent over signed form and patients progress note to # [phone]

## 2020-09-01 ENCOUNTER — TELEPHONE (OUTPATIENT)
Dept: FAMILY MEDICINE CLINIC | Facility: CLINIC | Age: 25
End: 2020-09-01

## 2020-09-28 ENCOUNTER — TELEPHONE (OUTPATIENT)
Dept: FAMILY MEDICINE CLINIC | Facility: CLINIC | Age: 25
End: 2020-09-28

## 2020-09-28 NOTE — TELEPHONE ENCOUNTER
Awilda Pardo from the medical records department from [address] [place] [address], is calling to see if she can get some information regarding Rian Johnston catheter supplies, Dr Karen José is ordering julito 7 catheters a day but it is not stated in her notes, she is just wondering if

## 2020-09-29 ENCOUNTER — TELEPHONE (OUTPATIENT)
Dept: FAMILY MEDICINE CLINIC | Facility: CLINIC | Age: 25
End: 2020-09-29

## 2020-09-29 NOTE — TELEPHONE ENCOUNTER
Received a medical records request from [address] [place] [address], sent request to scan stat

## 2020-10-09 ENCOUNTER — TELEMEDICINE (OUTPATIENT)
Dept: FAMILY MEDICINE CLINIC | Facility: CLINIC | Age: 25
End: 2020-10-09

## 2020-10-09 ENCOUNTER — TELEMEDICINE (OUTPATIENT)
Dept: TELEHEALTH | Age: 25
End: 2020-10-09

## 2020-10-09 VITALS — BODY MASS INDEX: 25 KG/M2 | WEIGHT: 175 LBS

## 2020-10-09 DIAGNOSIS — Z02.9 ENCOUNTERS FOR ADMINISTRATIVE PURPOSE: Primary | ICD-10-CM

## 2020-10-09 DIAGNOSIS — K12.1 ORAL ULCER: Primary | ICD-10-CM

## 2020-10-09 PROCEDURE — 99213 OFFICE O/P EST LOW 20 MIN: CPT | Performed by: FAMILY MEDICINE

## 2020-10-09 NOTE — PROGRESS NOTES
Patient had telemed appointment with PCP. He also signed up for [employer] VV, but he did not connect to it after 1 hour. No charge for this video visit.

## 2020-10-09 NOTE — PROGRESS NOTES
My chart video visit with synchronous audio and video    Tahirmayra Hillde verbally consents to a My chart video visit with synchronous audio and videoservice on 10/09/20.   Patient has been referred to the [facility] website at [email or web address] to antonio Aero Soln INHALE 2 PUFFS EVERY 4 HOURS AS NEEDED FOR WHEEZING/COUGH 18 Inhaler 1   • Oxybutynin Chloride 5 MG Oral Tab TAKE 1 AND 1/2 TABLETS BY MOUTH 3 TIMES A  tablet 3   • ARIPIPRAZOLE 2 MG Oral Tab TAKE 1 TABLET BY MOUTH EVERY DAY 30 tablet 0 predominantly inattentive type     Episode of abnormal behavior     Seizure ([address]     Fracture of thoracic vertebra, T1-T6 with complete cord lesion     Varicella     Moderate persistent asthma without complication     Mood disorder ([address]     Hyperglycemia visit. No increased work of breathing. NEURO: Alert and oriented x3  PSYCH: Intonation of voice is normal.  No pressured speech. ASSESSMENT AND PLAN:     Oral ulcer  (primary encounter diagnosis)    1.  Oral ulcer  Trial of \"magic mouthwash\", if sy

## 2020-10-22 ENCOUNTER — TELEPHONE (OUTPATIENT)
Dept: FAMILY MEDICINE CLINIC | Facility: CLINIC | Age: 25
End: 2020-10-22

## 2020-10-22 NOTE — TELEPHONE ENCOUNTER
The patient scheduled a Medicare Annual Wellness Visit for:     Future Appointments   Date Time Provider Ganesh Luevano   12/2/2020  1:00 PM Zaida Toscano DO [facility] [place]

## 2020-10-23 DIAGNOSIS — J45.40 MODERATE PERSISTENT ASTHMA WITHOUT COMPLICATION: ICD-10-CM

## 2020-10-23 DIAGNOSIS — Z79.899 ENCOUNTER FOR LONG-TERM CURRENT USE OF MEDICATION: ICD-10-CM

## 2020-10-23 RX ORDER — ALBUTEROL SULFATE 90 UG/1
AEROSOL, METERED RESPIRATORY (INHALATION)
Qty: 18 G | Refills: 2 | Status: SHIPPED | OUTPATIENT
Start: 2020-10-23

## 2020-10-26 ENCOUNTER — TELEPHONE (OUTPATIENT)
Dept: FAMILY MEDICINE CLINIC | Facility: CLINIC | Age: 25
End: 2020-10-26

## 2020-10-26 NOTE — TELEPHONE ENCOUNTER
Left message for patient to call back for further informations. Left detailed message recommending he make appt (video/in office)  Or he can proceed to walk in clinic or immediate care if discomfort is not tolerable today.

## 2020-10-26 NOTE — TELEPHONE ENCOUNTER
Pt wants to know if Dr. Karen José would send something in for his acne.  States his acne is so bad it looks like cyst. Patient states he is currently on Ciprofloxacin 500mg 1 tab twice a day for uti not sure if that is causing the worsening of the acne but he

## 2020-10-28 NOTE — TELEPHONE ENCOUNTER
Second attempt to reach patient. Advised he call the office back for appointment if he still needs recommendations for acne.

## 2021-02-03 ENCOUNTER — TELEMEDICINE (OUTPATIENT)
Dept: FAMILY MEDICINE CLINIC | Facility: CLINIC | Age: 26
End: 2021-02-03
Payer: MEDICARE

## 2021-02-03 DIAGNOSIS — J45.40 MODERATE PERSISTENT ASTHMA WITHOUT COMPLICATION: ICD-10-CM

## 2021-02-03 DIAGNOSIS — Z87.828 HISTORY OF SPINAL CORD INJURY: ICD-10-CM

## 2021-02-03 DIAGNOSIS — N31.9 NEUROGENIC BLADDER: ICD-10-CM

## 2021-02-03 DIAGNOSIS — L01.00 IMPETIGO: Primary | ICD-10-CM

## 2021-02-03 DIAGNOSIS — Z79.899 ENCOUNTER FOR LONG-TERM CURRENT USE OF MEDICATION: ICD-10-CM

## 2021-02-03 DIAGNOSIS — L08.0 ECTHYMA: ICD-10-CM

## 2021-02-03 DIAGNOSIS — G82.20 PARALYSIS OF BOTH LOWER LIMBS (HCC): ICD-10-CM

## 2021-02-03 PROCEDURE — 99214 OFFICE O/P EST MOD 30 MIN: CPT | Performed by: FAMILY MEDICINE

## 2021-02-03 RX ORDER — SULFAMETHOXAZOLE AND TRIMETHOPRIM 800; 160 MG/1; MG/1
1 TABLET ORAL EVERY 12 HOURS
Qty: 20 TABLET | Refills: 0 | Status: SHIPPED | OUTPATIENT
Start: 2021-02-03 | End: 2021-02-13

## 2021-02-03 RX ORDER — OXYBUTYNIN CHLORIDE 5 MG/1
TABLET ORAL
Qty: 405 TABLET | Refills: 3 | Status: SHIPPED | OUTPATIENT
Start: 2021-02-03

## 2021-02-03 NOTE — PROGRESS NOTES
MyChart video visit with live interactive synchronous audio and video    Ingridspenser Mclaughlin verbally consents to a [id number] video visit with live interactive synchronous audio and video service on 02/03/21.   Patient has been referred to the [facility] website at w (three) times daily. 30 g 0   • Sulfamethoxazole-TMP -160 MG Oral Tab per tablet Take 1 tablet by mouth every 12 (twelve) hours for 10 days.  20 tablet 0   • Fluticasone Furoate-Vilanterol (BREO ELLIPTA) 200-25 MCG/INH Inhalation Aerosol Powder, Breat patches onto the skin daily. As needed   0   • Bisacodyl 10 MG Rectal Suppos Place 10 mg rectally every other day.           Patient Active Problem List:     Heartburn     Unspecified constipation     Abnormal involuntary movements(781.0)     Paralysis of b No new rashes. EYES: Denies changes in vision  HEENT: Denies sore throat or other upper respiratory symptoms. LUNGS: Denies cough or shortness of breath  CARDIOVASCULAR: Denies chest pain or palpitations.   GI: Denies abdominal pain, nausea, vomiting, or lower limbs ([address]  5. History of spinal cord injury  6. Neurogenic bladder  Patient with history of spinal cord injury, has paralysis of both lower extremities and neurogenic bladder. Continue oxybutynin.   Patient requesting referral for occupational ther

## 2021-02-03 NOTE — PATIENT INSTRUCTIONS
Understanding Impetigo  Impetigo is a common bacterial infection of the skin. It most often affects the face, arms, and legs. But it can appear on any part of the body. Anyone can have it, regardless of age. But it's most common in children.  Impetigo is · To prevent spreading the infection, wash your hands often. Avoid sharing personal items, towels, clothes, pillows, and sheets with others. After each use, wash these items in hot water. · Clean the affected skin several times a day. Don’t scrub.  Instead

## 2021-02-04 ENCOUNTER — TELEPHONE (OUTPATIENT)
Dept: FAMILY MEDICINE CLINIC | Facility: CLINIC | Age: 26
End: 2021-02-04

## 2021-02-06 PROBLEM — L01.00 IMPETIGO: Status: ACTIVE | Noted: 2021-02-06

## 2021-02-06 PROBLEM — L08.0 ECTHYMA: Status: ACTIVE | Noted: 2021-02-06

## 2021-03-26 ENCOUNTER — TELEPHONE (OUTPATIENT)
Dept: FAMILY MEDICINE CLINIC | Facility: CLINIC | Age: 26
End: 2021-03-26

## 2021-03-26 NOTE — TELEPHONE ENCOUNTER
Explained the vaccination procedure through [facility]. VU and in agreement.

## 2021-03-26 NOTE — TELEPHONE ENCOUNTER
Elham Wilburn (father) is calling to see what he can do to get a order from Dr Benji Carmona to get his son Elham Wilburn the Covid vaccine, he has chronic respitory issues, he is a paraplegic, and he would like to take him somewhere to get his covid vaccine, but he does kn

## 2021-05-14 ENCOUNTER — TELEPHONE (OUTPATIENT)
Dept: FAMILY MEDICINE CLINIC | Facility: CLINIC | Age: 26
End: 2021-05-14

## 2021-05-14 NOTE — TELEPHONE ENCOUNTER
Melvi Torres was in [place] for about two weeks for cutting. His dad says he was using cocaine. Tuesday patients dad noticed pitting edema to both feet. Now it is up his legs bilat.  States staff had him in his w/c a lot while in hospital.  Has been in bed pr

## 2021-05-14 NOTE — TELEPHONE ENCOUNTER
Juana Humphrey (dad) is calling to see if Ag Clemens can be seen by Dr Sundeep Tran today, he went into [facility] 2 weeks ago for cutting himself, he was discharged to [facility] for a [facility], he is now at home and has edema up his legs, Please call Juana Humphrey at [phone]

## 2021-05-14 NOTE — TELEPHONE ENCOUNTER
Recommend patient go to the [facility] immediate care, patient may need imaging and labs.

## 2022-02-22 ENCOUNTER — PATIENT OUTREACH (OUTPATIENT)
Dept: FAMILY MEDICINE CLINIC | Facility: CLINIC | Age: 27
End: 2022-02-22

## 2022-02-22 NOTE — PROGRESS NOTES
An attempt was made to contact the patient in order to schedule a Medicare Annual Wellness Visit. A message was left on the patient's confidential voicemail asking him to call back.

## 2022-09-12 ENCOUNTER — EXTERNAL RECORD (OUTPATIENT)
Dept: HEALTH INFORMATION MANAGEMENT | Facility: OTHER | Age: 27
End: 2022-09-12

## 2022-09-23 ENCOUNTER — OFFICE VISIT (OUTPATIENT)
Dept: NEUROSURGERY | Age: 27
End: 2022-09-23

## 2022-09-23 VITALS
SYSTOLIC BLOOD PRESSURE: 107 MMHG | RESPIRATION RATE: 18 BRPM | WEIGHT: 190 LBS | DIASTOLIC BLOOD PRESSURE: 73 MMHG | HEART RATE: 62 BPM

## 2022-09-23 DIAGNOSIS — R25.2 SPASTICITY: Primary | ICD-10-CM

## 2022-09-23 PROCEDURE — 99203 OFFICE O/P NEW LOW 30 MIN: CPT | Performed by: NEUROLOGICAL SURGERY

## 2022-09-23 RX ORDER — MAG HYDROX/ALUMINUM HYD/SIMETH 400-400-40
SUSPENSION, ORAL (FINAL DOSE FORM) ORAL
COMMUNITY

## 2022-09-23 RX ORDER — BUPIVACAINE HYDROCHLORIDE 5 MG/ML
INJECTION, SOLUTION PERINEURAL
COMMUNITY

## 2022-09-23 RX ORDER — NAPROXEN 500 MG/1
TABLET ORAL
COMMUNITY

## 2022-09-23 RX ORDER — OMEPRAZOLE 20 MG/1
20 CAPSULE, DELAYED RELEASE ORAL
COMMUNITY

## 2022-09-23 RX ORDER — DULOXETIN HYDROCHLORIDE 20 MG/1
20 CAPSULE, DELAYED RELEASE ORAL DAILY
COMMUNITY

## 2022-09-23 RX ORDER — BISACODYL 10 MG
10 SUPPOSITORY, RECTAL RECTAL
COMMUNITY

## 2022-09-23 RX ORDER — HYDROCODONE BITARTRATE AND ACETAMINOPHEN 5; 325 MG/1; MG/1
TABLET ORAL
COMMUNITY
Start: 2022-06-02

## 2022-09-23 RX ORDER — ARIPIPRAZOLE 10 MG/1
10 TABLET ORAL DAILY
COMMUNITY

## 2022-09-23 RX ORDER — OXYBUTYNIN CHLORIDE 5 MG/1
5 TABLET ORAL
COMMUNITY

## 2022-09-23 ASSESSMENT — PAIN SCALES - GENERAL: PAINLEVEL: 3

## 2022-09-26 ENCOUNTER — PREP FOR CASE (OUTPATIENT)
Dept: NEUROSURGERY | Age: 27
End: 2022-09-26

## 2022-09-26 ENCOUNTER — TELEPHONE (OUTPATIENT)
Dept: FAMILY MEDICINE CLINIC | Facility: CLINIC | Age: 27
End: 2022-09-26

## 2022-09-26 ENCOUNTER — TELEPHONE (OUTPATIENT)
Dept: NEUROSURGERY | Age: 27
End: 2022-09-26

## 2022-09-26 DIAGNOSIS — R25.2 SPASTICITY: Primary | ICD-10-CM

## 2022-09-26 NOTE — TELEPHONE ENCOUNTER
Received faxed orders from Dr. Malik Cordova for surgery on 10/12/2022 for Baclofen pump  Left VM for patient to call and scheduled preop appt   Checklist and orders in  folder marked preop     Pt scheduled  09/29 preop

## 2022-09-27 ENCOUNTER — TELEPHONE (OUTPATIENT)
Dept: FAMILY MEDICINE CLINIC | Facility: CLINIC | Age: 27
End: 2022-09-27

## 2022-09-27 NOTE — TELEPHONE ENCOUNTER
Pre op check list completed with orders placed in MA basket     Dr [facility] VLADIMIR BRADSHAW

## 2022-09-30 ENCOUNTER — EXTERNAL RECORD (OUTPATIENT)
Dept: HEALTH INFORMATION MANAGEMENT | Facility: OTHER | Age: 27
End: 2022-09-30

## 2022-09-30 ENCOUNTER — OFFICE VISIT (OUTPATIENT)
Dept: FAMILY MEDICINE CLINIC | Facility: CLINIC | Age: 27
End: 2022-09-30
Payer: MEDICARE

## 2022-09-30 VITALS
HEART RATE: 85 BPM | DIASTOLIC BLOOD PRESSURE: 80 MMHG | RESPIRATION RATE: 20 BRPM | SYSTOLIC BLOOD PRESSURE: 120 MMHG | OXYGEN SATURATION: 99 %

## 2022-09-30 DIAGNOSIS — G82.20 PARALYSIS OF BOTH LOWER LIMBS (HCC): ICD-10-CM

## 2022-09-30 DIAGNOSIS — Z01.818 PREOP EXAMINATION: Primary | ICD-10-CM

## 2022-09-30 DIAGNOSIS — Z95.828 PRESENCE OF IVC FILTER: ICD-10-CM

## 2022-09-30 DIAGNOSIS — R94.31 RIGHT AXIS DEVIATION: ICD-10-CM

## 2022-09-30 DIAGNOSIS — J45.40 MODERATE PERSISTENT ASTHMA WITHOUT COMPLICATION: ICD-10-CM

## 2022-09-30 DIAGNOSIS — R94.31 ABNORMAL ECG: ICD-10-CM

## 2022-09-30 DIAGNOSIS — Z97.8 PRESENCE OF INTRATHECAL PUMP: ICD-10-CM

## 2022-09-30 DIAGNOSIS — Z01.818 PREOP TESTING: ICD-10-CM

## 2022-09-30 DIAGNOSIS — Z23 NEED FOR VACCINATION: ICD-10-CM

## 2022-09-30 DIAGNOSIS — Z79.899 ENCOUNTER FOR LONG-TERM CURRENT USE OF MEDICATION: ICD-10-CM

## 2022-09-30 DIAGNOSIS — F39 MOOD DISORDER (HCC): ICD-10-CM

## 2022-09-30 RX ORDER — FAMOTIDINE 20 MG/1
20 TABLET, FILM COATED ORAL AS NEEDED
COMMUNITY
Start: 2019-11-08

## 2022-09-30 RX ORDER — DULOXETIN HYDROCHLORIDE 20 MG/1
20 CAPSULE, DELAYED RELEASE ORAL DAILY
COMMUNITY
Start: 2019-11-08

## 2022-09-30 RX ORDER — ONDANSETRON 4 MG/1
4 TABLET, FILM COATED ORAL AS NEEDED
COMMUNITY
Start: 2018-07-28

## 2022-09-30 RX ORDER — MAG HYDROX/ALUMINUM HYD/SIMETH 400-400-40
1 SUSPENSION, ORAL (FINAL DOSE FORM) ORAL AS NEEDED
COMMUNITY
End: 2022-09-30

## 2022-09-30 RX ORDER — MORPHINE SULFATE 100 %
POWDER (GRAM) MISCELLANEOUS
COMMUNITY
Start: 2022-09-21

## 2022-09-30 RX ORDER — PREGABALIN 300 MG/1
300 CAPSULE ORAL DAILY
COMMUNITY
Start: 2018-07-28

## 2022-09-30 RX ORDER — OMEPRAZOLE 20 MG/1
20 CAPSULE, DELAYED RELEASE ORAL AS NEEDED
COMMUNITY
Start: 2018-07-28 | End: 2022-09-30

## 2022-09-30 RX ORDER — TOLTERODINE TARTRATE 1 MG/1
1 TABLET, EXTENDED RELEASE ORAL DAILY
COMMUNITY
Start: 2022-03-09

## 2022-09-30 RX ORDER — ALBUTEROL SULFATE 90 UG/1
2 AEROSOL, METERED RESPIRATORY (INHALATION) EVERY 6 HOURS PRN
Qty: 18 G | Refills: 0 | Status: SHIPPED | OUTPATIENT
Start: 2022-09-30

## 2022-09-30 RX ORDER — LIDOCAINE 50 MG/G
2 PATCH TOPICAL AS NEEDED
COMMUNITY
Start: 2018-07-28

## 2022-09-30 RX ORDER — FLUTICASONE FUROATE AND VILANTEROL 200; 25 UG/1; UG/1
1 POWDER RESPIRATORY (INHALATION) DAILY
Qty: 3 EACH | Refills: 3 | Status: SHIPPED | OUTPATIENT
Start: 2022-09-30

## 2022-09-30 RX ORDER — FLUTICASONE FUROATE AND VILANTEROL 100; 25 UG/1; UG/1
1 POWDER RESPIRATORY (INHALATION) DAILY
COMMUNITY
Start: 2018-07-28 | End: 2022-09-30 | Stop reason: CLARIF

## 2022-09-30 NOTE — PATIENT INSTRUCTIONS
-We will clear you to proceed with surgery upon review of the visit note from Dr. Ginna Morales.      -Please remember to have your specialists send us their progress notes.

## 2022-10-05 ENCOUNTER — EXTERNAL RECORD (OUTPATIENT)
Dept: HEALTH INFORMATION MANAGEMENT | Facility: OTHER | Age: 27
End: 2022-10-05

## 2022-10-05 ENCOUNTER — TELEPHONE (OUTPATIENT)
Dept: FAMILY MEDICINE CLINIC | Facility: CLINIC | Age: 27
End: 2022-10-05

## 2022-10-05 ENCOUNTER — HOSPITAL ENCOUNTER (OUTPATIENT)
Dept: GENERAL RADIOLOGY | Age: 27
Discharge: HOME | End: 2022-10-05
Attending: FAMILY MEDICINE
Payer: MEDICARE

## 2022-10-05 ENCOUNTER — LAB ENCOUNTER (OUTPATIENT)
Dept: LAB | Age: 27
End: 2022-10-05
Attending: FAMILY MEDICINE
Payer: MEDICARE

## 2022-10-05 DIAGNOSIS — Z79.899 ENCOUNTER FOR LONG-TERM CURRENT USE OF MEDICATION: ICD-10-CM

## 2022-10-05 DIAGNOSIS — Z97.8 PRESENCE OF INTRATHECAL PUMP: ICD-10-CM

## 2022-10-05 DIAGNOSIS — G82.20 PARALYSIS OF BOTH LOWER LIMBS (HCC): ICD-10-CM

## 2022-10-05 DIAGNOSIS — Z01.818 PREOP TESTING: ICD-10-CM

## 2022-10-05 DIAGNOSIS — J45.40 MODERATE PERSISTENT ASTHMA WITHOUT COMPLICATION: ICD-10-CM

## 2022-10-05 DIAGNOSIS — Z01.818 PREOP EXAMINATION: ICD-10-CM

## 2022-10-05 DIAGNOSIS — Z01.818 PRE-OP TESTING: ICD-10-CM

## 2022-10-05 PROBLEM — M53.3 COCCYGODYNIA: Status: RESOLVED | Noted: 2017-05-25 | Resolved: 2022-10-05

## 2022-10-05 PROBLEM — R94.31 ABNORMAL ECG: Status: ACTIVE | Noted: 2022-10-05

## 2022-10-05 PROBLEM — L01.00 IMPETIGO: Status: RESOLVED | Noted: 2021-02-06 | Resolved: 2022-10-05

## 2022-10-05 PROBLEM — R94.31 RIGHT AXIS DEVIATION: Status: ACTIVE | Noted: 2022-10-05

## 2022-10-05 PROBLEM — Z95.828 PRESENCE OF IVC FILTER: Status: ACTIVE | Noted: 2022-10-05

## 2022-10-05 PROBLEM — L08.0 ECTHYMA: Status: RESOLVED | Noted: 2021-02-06 | Resolved: 2022-10-05

## 2022-10-05 PROBLEM — R73.9 HYPERGLYCEMIA: Status: RESOLVED | Noted: 2019-08-24 | Resolved: 2022-10-05

## 2022-10-05 LAB
ALBUMIN SERPL-MCNC: 3.8 G/DL (ref 3.4–5)
ALBUMIN/GLOB SERPL: 0.9 {RATIO} (ref 1–2)
ALP LIVER SERPL-CCNC: 93 U/L
ALT SERPL-CCNC: 31 U/L
ANION GAP SERPL CALC-SCNC: 5 MMOL/L (ref 0–18)
APTT PPP: 25.5 SECONDS (ref 23.3–35.6)
AST SERPL-CCNC: 16 U/L (ref 15–37)
BASOPHILS # BLD AUTO: 0.03 X10(3) UL (ref 0–0.2)
BASOPHILS NFR BLD AUTO: 0.6 %
BILIRUB SERPL-MCNC: 0.5 MG/DL (ref 0.1–2)
BILIRUB UR QL CFM: NEGATIVE
BUN BLD-MCNC: 18 MG/DL (ref 7–18)
BUN/CREAT SERPL: 26.1 (ref 10–20)
CALCIUM BLD-MCNC: 9.4 MG/DL (ref 8.5–10.1)
CHLORIDE SERPL-SCNC: 102 MMOL/L (ref 98–112)
CO2 SERPL-SCNC: 30 MMOL/L (ref 21–32)
COLOR UR: YELLOW
CREAT BLD-MCNC: 0.69 MG/DL
DEPRECATED RDW RBC AUTO: 37.4 FL (ref 35.1–46.3)
EOSINOPHIL # BLD AUTO: 0.15 X10(3) UL (ref 0–0.7)
EOSINOPHIL NFR BLD AUTO: 3.1 %
ERYTHROCYTE [DISTWIDTH] IN BLOOD BY AUTOMATED COUNT: 12 % (ref 11–15)
FASTING STATUS PATIENT QL REPORTED: YES
GFR SERPLBLD BASED ON 1.73 SQ M-ARVRAT: 128 ML/MIN/1.73M2 (ref 60–?)
GLOBULIN PLAS-MCNC: 4.1 G/DL (ref 2.8–4.4)
GLUCOSE BLD-MCNC: 98 MG/DL (ref 70–99)
GLUCOSE UR-MCNC: NEGATIVE MG/DL
HCT VFR BLD AUTO: 40.8 %
HGB BLD-MCNC: 13.5 G/DL
HGB UR QL STRIP.AUTO: NEGATIVE
HYALINE CASTS #/AREA URNS AUTO: PRESENT /LPF
IMM GRANULOCYTES # BLD AUTO: 0.01 X10(3) UL (ref 0–1)
IMM GRANULOCYTES NFR BLD: 0.2 %
INR BLD: 0.97 (ref 0.85–1.16)
KETONES UR-MCNC: 15 MG/DL
LYMPHOCYTES # BLD AUTO: 2.44 X10(3) UL (ref 1–4)
LYMPHOCYTES NFR BLD AUTO: 50 %
MCH RBC QN AUTO: 28.1 PG (ref 26–34)
MCHC RBC AUTO-ENTMCNC: 33.1 G/DL (ref 31–37)
MCV RBC AUTO: 85 FL
MONOCYTES # BLD AUTO: 0.27 X10(3) UL (ref 0.1–1)
MONOCYTES NFR BLD AUTO: 5.5 %
NEUTROPHILS # BLD AUTO: 1.98 X10 (3) UL (ref 1.5–7.7)
NEUTROPHILS # BLD AUTO: 1.98 X10(3) UL (ref 1.5–7.7)
NEUTROPHILS NFR BLD AUTO: 40.6 %
NITRITE UR QL STRIP.AUTO: NEGATIVE
OSMOLALITY SERPL CALC.SUM OF ELEC: 286 MOSM/KG (ref 275–295)
PH UR: 5.5 [PH] (ref 5–8)
PLATELET # BLD AUTO: 211 10(3)UL (ref 150–450)
POTASSIUM SERPL-SCNC: 3.7 MMOL/L (ref 3.5–5.1)
PROT SERPL-MCNC: 7.9 G/DL (ref 6.4–8.2)
PROT UR-MCNC: NEGATIVE MG/DL
PROTHROMBIN TIME: 12.8 SECONDS (ref 11.6–14.8)
RBC # BLD AUTO: 4.8 X10(6)UL
SODIUM SERPL-SCNC: 137 MMOL/L (ref 136–145)
SP GR UR STRIP: 1.02 (ref 1–1.03)
UROBILINOGEN UR STRIP-ACNC: 0.2
WBC # BLD AUTO: 4.9 X10(3) UL (ref 4–11)

## 2022-10-05 PROCEDURE — 71046 X-RAY EXAM CHEST 2 VIEWS: CPT | Performed by: FAMILY MEDICINE

## 2022-10-05 PROCEDURE — 85025 COMPLETE CBC W/AUTO DIFF WBC: CPT

## 2022-10-05 PROCEDURE — 81015 MICROSCOPIC EXAM OF URINE: CPT

## 2022-10-05 PROCEDURE — 85610 PROTHROMBIN TIME: CPT

## 2022-10-05 PROCEDURE — 85730 THROMBOPLASTIN TIME PARTIAL: CPT

## 2022-10-05 PROCEDURE — 87086 URINE CULTURE/COLONY COUNT: CPT

## 2022-10-05 PROCEDURE — 81001 URINALYSIS AUTO W/SCOPE: CPT

## 2022-10-05 PROCEDURE — 80053 COMPREHEN METABOLIC PANEL: CPT

## 2022-10-06 NOTE — TELEPHONE ENCOUNTER
Please call patient:  1. Please inform patient an echocardiogram has been ordered due to abnormalities of ECG done in the office on 9/30/2022. The echocardiogram does not need to be completed prior to surgery. 2.  Please do asthma control test over the phone and asthma action plan. 3.  Please see lab result note regarding urine specimen collection, below are the results comments. Please call patient Re: Preop labs:  Please ask patient if the urine dip was a clean-catch, a clean-catch specimen collection may need to be described to patient to verify that it was a clean-catch. Urine culture is in process.   CBC is normal.  CMP is normal.  PT/INR and PTT normal.

## 2022-10-07 NOTE — TELEPHONE ENCOUNTER
Pt's mom called and is requesting to speak to someone about the testing that needs to be done. Stated that she just wants to confirm what needs to be done.

## 2022-10-07 NOTE — TELEPHONE ENCOUNTER
Pre-op clearance faxed to Dr. Bella Tsang office. Left message with answering service to send message to [facility] requesting call back for confirmation of receipt of fax.

## 2022-10-07 NOTE — TELEPHONE ENCOUNTER
Nurse Uli Baptiste requesting documents for pre-op clearance for patient. Surgery scheduled for 10/12/22.  Uli Baptiste informed documents to be faxed before the end of the day

## 2022-10-10 ENCOUNTER — LAB SERVICES (OUTPATIENT)
Dept: LAB | Age: 27
End: 2022-10-10

## 2022-10-10 DIAGNOSIS — Z01.812 PRE-PROCEDURAL LABORATORY EXAMINATION: Primary | ICD-10-CM

## 2022-10-10 PROCEDURE — U0003 INFECTIOUS AGENT DETECTION BY NUCLEIC ACID (DNA OR RNA); SEVERE ACUTE RESPIRATORY SYNDROME CORONAVIRUS 2 (SARS-COV-2) (CORONAVIRUS DISEASE [COVID-19]), AMPLIFIED PROBE TECHNIQUE, MAKING USE OF HIGH THROUGHPUT TECHNOLOGIES AS DESCRIBED BY CMS-2020-01-R: HCPCS | Performed by: CLINICAL MEDICAL LABORATORY

## 2022-10-10 PROCEDURE — U0005 INFEC AGEN DETEC AMPLI PROBE: HCPCS | Performed by: CLINICAL MEDICAL LABORATORY

## 2022-10-11 LAB
SARS-COV-2 RNA RESP QL NAA+PROBE: NOT DETECTED
SERVICE CMNT-IMP: NORMAL
SERVICE CMNT-IMP: NORMAL

## 2022-10-12 ENCOUNTER — ANESTHESIA (OUTPATIENT)
Dept: SURGERY | Age: 27
End: 2022-10-12

## 2022-10-12 ENCOUNTER — HOSPITAL ENCOUNTER (OUTPATIENT)
Age: 27
Discharge: HOME | End: 2022-10-12
Attending: NEUROLOGICAL SURGERY | Admitting: NEUROLOGICAL SURGERY

## 2022-10-12 ENCOUNTER — ANESTHESIA EVENT (OUTPATIENT)
Dept: SURGERY | Age: 27
End: 2022-10-12

## 2022-10-12 VITALS
DIASTOLIC BLOOD PRESSURE: 82 MMHG | RESPIRATION RATE: 18 BRPM | TEMPERATURE: 98.1 F | HEART RATE: 60 BPM | WEIGHT: 190 LBS | SYSTOLIC BLOOD PRESSURE: 134 MMHG | BODY MASS INDEX: 27.2 KG/M2 | HEIGHT: 70 IN | OXYGEN SATURATION: 99 %

## 2022-10-12 PROCEDURE — C1772 INFUSION PUMP, PROGRAMMABLE: HCPCS | Performed by: NEUROLOGICAL SURGERY

## 2022-10-12 PROCEDURE — 10006023 HB SUPPLY 272: Performed by: NEUROLOGICAL SURGERY

## 2022-10-12 PROCEDURE — 10002803 HB RX 637: Performed by: PHYSICIAN ASSISTANT

## 2022-10-12 PROCEDURE — 10006027 HB SUPPLY 278: Performed by: NEUROLOGICAL SURGERY

## 2022-10-12 PROCEDURE — 13000035 HB BASIC CASE EA ADD MINUTE: Performed by: NEUROLOGICAL SURGERY

## 2022-10-12 PROCEDURE — 10002801 HB RX 250 W/O HCPCS: Performed by: ANESTHESIOLOGY

## 2022-10-12 PROCEDURE — 13000034 HB BASIC CASE  S/U +1ST 15 MIN: Performed by: NEUROLOGICAL SURGERY

## 2022-10-12 PROCEDURE — 10002807 HB RX 258: Performed by: NEUROLOGICAL SURGERY

## 2022-10-12 PROCEDURE — X1094 NO CHARGE VISIT: HCPCS | Performed by: PHYSICIAN ASSISTANT

## 2022-10-12 PROCEDURE — 62362 IMPLANT SPINE INFUSION PUMP: CPT | Performed by: NEUROLOGICAL SURGERY

## 2022-10-12 PROCEDURE — 10002800 HB RX 250 W HCPCS: Performed by: ANESTHESIOLOGY

## 2022-10-12 PROCEDURE — 10002807 HB RX 258: Performed by: ANESTHESIOLOGY

## 2022-10-12 PROCEDURE — 10002801 HB RX 250 W/O HCPCS: Performed by: NEUROLOGICAL SURGERY

## 2022-10-12 PROCEDURE — 13000001 HB PHASE II RECOVERY EA 30 MINUTES: Performed by: NEUROLOGICAL SURGERY

## 2022-10-12 PROCEDURE — 13000007 HB ANESTHESIA MAC EA ADD MINUTE: Performed by: NEUROLOGICAL SURGERY

## 2022-10-12 PROCEDURE — 13000006 HB ANESTHESIA MAC S/U + 1ST 15 MIN: Performed by: NEUROLOGICAL SURGERY

## 2022-10-12 DEVICE — PUMP IT 40ML SNCHR 2 PRGM RADOPQ ID BCTR RTN FLTR SIL THK26: Type: IMPLANTABLE DEVICE | Site: ABDOMEN | Status: FUNCTIONAL

## 2022-10-12 RX ORDER — LIDOCAINE HYDROCHLORIDE AND EPINEPHRINE 10; 10 MG/ML; UG/ML
INJECTION, SOLUTION INFILTRATION; PERINEURAL PRN
Status: DISCONTINUED | OUTPATIENT
Start: 2022-10-12 | End: 2022-10-12 | Stop reason: HOSPADM

## 2022-10-12 RX ORDER — DEXTROSE MONOHYDRATE 50 MG/ML
INJECTION, SOLUTION INTRAVENOUS CONTINUOUS PRN
Status: DISCONTINUED | OUTPATIENT
Start: 2022-10-12 | End: 2022-10-12 | Stop reason: HOSPADM

## 2022-10-12 RX ORDER — SODIUM CHLORIDE, SODIUM LACTATE, POTASSIUM CHLORIDE, CALCIUM CHLORIDE 600; 310; 30; 20 MG/100ML; MG/100ML; MG/100ML; MG/100ML
INJECTION, SOLUTION INTRAVENOUS CONTINUOUS
Status: DISCONTINUED | OUTPATIENT
Start: 2022-10-12 | End: 2022-10-12 | Stop reason: HOSPADM

## 2022-10-12 RX ORDER — ONDANSETRON 2 MG/ML
4 INJECTION INTRAMUSCULAR; INTRAVENOUS
Status: DISCONTINUED | OUTPATIENT
Start: 2022-10-12 | End: 2022-10-12 | Stop reason: HOSPADM

## 2022-10-12 RX ORDER — ACETAMINOPHEN 325 MG/1
650 TABLET ORAL EVERY 4 HOURS PRN
COMMUNITY

## 2022-10-12 RX ORDER — PREGABALIN 300 MG/1
300 CAPSULE ORAL 2 TIMES DAILY
COMMUNITY
End: 2023-09-28 | Stop reason: SDUPTHER

## 2022-10-12 RX ORDER — HYDROCODONE BITARTRATE AND ACETAMINOPHEN 5; 325 MG/1; MG/1
1 TABLET ORAL EVERY 4 HOURS PRN
Status: COMPLETED | OUTPATIENT
Start: 2022-10-12 | End: 2022-10-12

## 2022-10-12 RX ORDER — SODIUM CHLORIDE 9 MG/ML
INJECTION, SOLUTION INTRAVENOUS CONTINUOUS
Status: DISCONTINUED | OUTPATIENT
Start: 2022-10-12 | End: 2022-10-12 | Stop reason: HOSPADM

## 2022-10-12 RX ORDER — LIDOCAINE HYDROCHLORIDE 10 MG/ML
5-10 INJECTION, SOLUTION INFILTRATION; PERINEURAL PRN
Status: DISCONTINUED | OUTPATIENT
Start: 2022-10-12 | End: 2022-10-12 | Stop reason: HOSPADM

## 2022-10-12 RX ORDER — PROPOFOL 10 MG/ML
INJECTION, EMULSION INTRAVENOUS PRN
Status: DISCONTINUED | OUTPATIENT
Start: 2022-10-12 | End: 2022-10-12

## 2022-10-12 RX ORDER — LEVETIRACETAM 1000 MG/1
1000 TABLET ORAL 2 TIMES DAILY
COMMUNITY

## 2022-10-12 RX ORDER — MIDAZOLAM HYDROCHLORIDE 1 MG/ML
INJECTION, SOLUTION INTRAMUSCULAR; INTRAVENOUS PRN
Status: DISCONTINUED | OUTPATIENT
Start: 2022-10-12 | End: 2022-10-12

## 2022-10-12 RX ORDER — SODIUM CHLORIDE, SODIUM LACTATE, POTASSIUM CHLORIDE, CALCIUM CHLORIDE 600; 310; 30; 20 MG/100ML; MG/100ML; MG/100ML; MG/100ML
INJECTION, SOLUTION INTRAVENOUS CONTINUOUS PRN
Status: DISCONTINUED | OUTPATIENT
Start: 2022-10-12 | End: 2022-10-12

## 2022-10-12 RX ADMIN — FENTANYL CITRATE 50 MCG: 50 INJECTION, SOLUTION INTRAMUSCULAR; INTRAVENOUS at 10:02

## 2022-10-12 RX ADMIN — HYDROCODONE BITARTRATE AND ACETAMINOPHEN 1 TABLET: 5; 325 TABLET ORAL at 12:18

## 2022-10-12 RX ADMIN — PROPOFOL 20 MG: 10 INJECTION, EMULSION INTRAVENOUS at 10:02

## 2022-10-12 RX ADMIN — MIDAZOLAM HYDROCHLORIDE 2 MG: 1 INJECTION, SOLUTION INTRAMUSCULAR; INTRAVENOUS at 10:00

## 2022-10-12 RX ADMIN — SODIUM CHLORIDE, POTASSIUM CHLORIDE, SODIUM LACTATE AND CALCIUM CHLORIDE: 600; 310; 30; 20 INJECTION, SOLUTION INTRAVENOUS at 09:57

## 2022-10-12 RX ADMIN — CEFAZOLIN SODIUM 2000 MG: 300 INJECTION, POWDER, LYOPHILIZED, FOR SOLUTION INTRAVENOUS at 10:03

## 2022-10-12 RX ADMIN — SODIUM CHLORIDE, POTASSIUM CHLORIDE, SODIUM LACTATE AND CALCIUM CHLORIDE: 600; 310; 30; 20 INJECTION, SOLUTION INTRAVENOUS at 08:35

## 2022-10-12 RX ADMIN — PROPOFOL 50 MCG/KG/MIN: 10 INJECTION, EMULSION INTRAVENOUS at 10:02

## 2022-10-12 SDOH — SOCIAL STABILITY: SOCIAL INSECURITY: RISK FACTORS: NEUROLOGICAL DISEASE

## 2022-10-12 ASSESSMENT — ACTIVITIES OF DAILY LIVING (ADL)
CONTINENCE: NEEDS ASSISTANCE
DRESSING: NEEDS ASSISTANCE
ADL_BEFORE_ADMISSION: NEEDS/REQUIRES ASSISTANCE
FEEDING: DEPENDENT
RECENT_DECLINE_ADL: NO
TOILETING: NEEDS ASSISTANCE
BATHING: NEEDS ASSISTANCE
TRANSFERRING: NEEDS ASSISTANCE
NEEDS_ASSIST: YES
ADL_SCORE: 5
CHRONIC_PAIN_PRESENT: YES, CHRONIC
HISTORY OF FALLING IN THE LAST YEAR (PRIOR TO ADMISSION): NO
MOBILITY_ASSIST_DEVICES: WHEELCHAIR
ADL_SHORT_OF_BREATH: NO

## 2022-10-12 ASSESSMENT — PAIN SCALES - GENERAL
PAINLEVEL_OUTOF10: 5
PAINLEVEL_OUTOF10: 0
PAINLEVEL_OUTOF10: 5
PAINLEVEL_OUTOF10: 3

## 2022-10-12 ASSESSMENT — COGNITIVE AND FUNCTIONAL STATUS - GENERAL
ARE YOU DEAF OR DO YOU HAVE SERIOUS DIFFICULTY  HEARING: NO
ARE YOU BLIND OR DO YOU HAVE SERIOUS DIFFICULTY SEEING, EVEN WHEN WEARING GLASSES: NO

## 2022-10-25 ENCOUNTER — HOSPITAL ENCOUNTER (OUTPATIENT)
Dept: MRI IMAGING | Age: 27
Discharge: HOME | End: 2022-10-25
Attending: PHYSICAL MEDICINE & REHABILITATION

## 2022-10-25 DIAGNOSIS — G95.0 SYRINX (CMD): ICD-10-CM

## 2022-10-25 DIAGNOSIS — G82.20 PARAPLEGIA (CMD): ICD-10-CM

## 2022-10-25 PROCEDURE — 10002805 HB CONTRAST AGENT: Performed by: PHYSICAL MEDICINE & REHABILITATION

## 2022-10-25 PROCEDURE — 72157 MRI CHEST SPINE W/O & W/DYE: CPT

## 2022-10-25 PROCEDURE — A9585 GADOBUTROL INJECTION: HCPCS | Performed by: PHYSICAL MEDICINE & REHABILITATION

## 2022-10-25 RX ORDER — GADOBUTROL 604.72 MG/ML
10 INJECTION INTRAVENOUS ONCE
Status: COMPLETED | OUTPATIENT
Start: 2022-10-25 | End: 2022-10-25

## 2022-10-25 RX ADMIN — GADOBUTROL 8.5 ML: 604.72 INJECTION INTRAVENOUS at 16:33

## 2022-10-27 DIAGNOSIS — J45.40 MODERATE PERSISTENT ASTHMA WITHOUT COMPLICATION: ICD-10-CM

## 2022-10-27 DIAGNOSIS — Z79.899 ENCOUNTER FOR LONG-TERM CURRENT USE OF MEDICATION: ICD-10-CM

## 2022-10-27 RX ORDER — ALBUTEROL SULFATE 90 UG/1
AEROSOL, METERED RESPIRATORY (INHALATION)
Qty: 8.5 EACH | Refills: 1 | Status: SHIPPED | OUTPATIENT
Start: 2022-10-27

## 2022-10-28 ENCOUNTER — OFFICE VISIT (OUTPATIENT)
Dept: NEUROSURGERY | Age: 27
End: 2022-10-28

## 2022-10-28 VITALS
DIASTOLIC BLOOD PRESSURE: 72 MMHG | SYSTOLIC BLOOD PRESSURE: 109 MMHG | RESPIRATION RATE: 18 BRPM | HEART RATE: 83 BPM | HEIGHT: 70 IN | BODY MASS INDEX: 27.2 KG/M2 | WEIGHT: 190 LBS

## 2022-10-28 DIAGNOSIS — R25.2 SPASTICITY: Primary | ICD-10-CM

## 2022-10-28 PROCEDURE — 99024 POSTOP FOLLOW-UP VISIT: CPT | Performed by: PHYSICIAN ASSISTANT

## 2022-10-28 RX ORDER — TOBRAMYCIN AND DEXAMETHASONE 3; 1 MG/ML; MG/ML
SUSPENSION/ DROPS OPHTHALMIC
COMMUNITY
Start: 2022-10-11

## 2022-10-28 RX ORDER — ALBUTEROL SULFATE 90 UG/1
AEROSOL, METERED RESPIRATORY (INHALATION)
COMMUNITY
Start: 2022-10-27

## 2022-10-28 RX ORDER — FLUTICASONE FUROATE AND VILANTEROL TRIFENATATE 200; 25 UG/1; UG/1
POWDER RESPIRATORY (INHALATION)
COMMUNITY
Start: 2022-10-05

## 2022-11-07 ENCOUNTER — PATIENT OUTREACH (OUTPATIENT)
Dept: FAMILY MEDICINE CLINIC | Facility: CLINIC | Age: 27
End: 2022-11-07

## 2022-11-07 NOTE — PROGRESS NOTES
Attempted to contact patient to schedule annual medicare wellness visit. No answer. Left voice mail.

## 2022-12-11 DIAGNOSIS — J45.40 MODERATE PERSISTENT ASTHMA WITHOUT COMPLICATION: ICD-10-CM

## 2022-12-11 DIAGNOSIS — Z79.899 ENCOUNTER FOR LONG-TERM CURRENT USE OF MEDICATION: ICD-10-CM

## 2022-12-12 RX ORDER — ALBUTEROL SULFATE 90 UG/1
AEROSOL, METERED RESPIRATORY (INHALATION)
Qty: 8.5 EACH | Refills: 1 | Status: SHIPPED | OUTPATIENT
Start: 2022-12-12

## 2022-12-22 ENCOUNTER — TELEPHONE (OUTPATIENT)
Dept: FAMILY MEDICINE CLINIC | Facility: CLINIC | Age: 27
End: 2022-12-22

## 2022-12-22 NOTE — TELEPHONE ENCOUNTER
Patient is not scheduled for surgery at this time   Was told to call pcp and schedule pre op    Pre op appt 01/25/2023    Dr Jose De Jesus Baron at [facility] ph# [phone]  Office ph# [phone]    Back/spine surgery     Patient is aware will call surgeons office and will have orders faxed to office     Yellow Pre op check list  folder

## 2023-01-25 ENCOUNTER — OFFICE VISIT (OUTPATIENT)
Dept: FAMILY MEDICINE CLINIC | Facility: CLINIC | Age: 28
End: 2023-01-25
Payer: MEDICARE

## 2023-01-25 ENCOUNTER — TELEPHONE (OUTPATIENT)
Dept: FAMILY MEDICINE CLINIC | Facility: CLINIC | Age: 28
End: 2023-01-25

## 2023-01-25 VITALS
SYSTOLIC BLOOD PRESSURE: 110 MMHG | HEART RATE: 125 BPM | RESPIRATION RATE: 20 BRPM | DIASTOLIC BLOOD PRESSURE: 70 MMHG | OXYGEN SATURATION: 98 % | TEMPERATURE: 98 F

## 2023-01-25 DIAGNOSIS — R00.0 TACHYCARDIA: ICD-10-CM

## 2023-01-25 DIAGNOSIS — Z13.6 SCREENING FOR HEART DISEASE: ICD-10-CM

## 2023-01-25 DIAGNOSIS — Z01.818 PREOP EXAMINATION: Primary | ICD-10-CM

## 2023-01-25 DIAGNOSIS — G95.0 SYRINGOMYELIA (HCC): ICD-10-CM

## 2023-01-25 DIAGNOSIS — G82.20 PARALYSIS OF BOTH LOWER LIMBS (HCC): ICD-10-CM

## 2023-01-25 DIAGNOSIS — Z86.718 HISTORY OF DVT (DEEP VEIN THROMBOSIS): ICD-10-CM

## 2023-01-25 DIAGNOSIS — Z72.89 DELIBERATE SELF-CUTTING: ICD-10-CM

## 2023-01-25 DIAGNOSIS — F33.2 SEVERE EPISODE OF RECURRENT MAJOR DEPRESSIVE DISORDER, WITHOUT PSYCHOTIC FEATURES (HCC): ICD-10-CM

## 2023-01-25 DIAGNOSIS — D68.9 COAGULATION DEFECT (HCC): ICD-10-CM

## 2023-01-25 DIAGNOSIS — Z79.899 ENCOUNTER FOR LONG-TERM CURRENT USE OF MEDICATION: ICD-10-CM

## 2023-01-25 DIAGNOSIS — J45.40 MODERATE PERSISTENT ASTHMA WITHOUT COMPLICATION: ICD-10-CM

## 2023-01-25 PROBLEM — F33.1 MAJOR DEPRESSIVE DISORDER, RECURRENT, MODERATE (HCC): Status: ACTIVE | Noted: 2023-01-25

## 2023-01-25 RX ORDER — ALBUTEROL SULFATE 90 UG/1
2 AEROSOL, METERED RESPIRATORY (INHALATION) EVERY 6 HOURS PRN
Qty: 1 EACH | Refills: 0 | Status: SHIPPED | OUTPATIENT
Start: 2023-01-25

## 2023-01-25 RX ORDER — ACETAMINOPHEN 325 MG/1
650 TABLET ORAL
COMMUNITY

## 2023-01-25 RX ORDER — BUPIVACAINE HYDROCHLORIDE 5 MG/ML
INJECTION, SOLUTION PERINEURAL
COMMUNITY
Start: 2018-07-28

## 2023-01-25 RX ORDER — LEVETIRACETAM 1000 MG/1
1000 TABLET ORAL
COMMUNITY
Start: 2019-11-04 | End: 2023-01-25

## 2023-01-25 NOTE — TELEPHONE ENCOUNTER
Per Lulu Koroma [place] for Dr. Mallory Hussein, okay to hold Eliquis for 3 days prior to surgery and resume as soon as it's okay from surgeon's standpoint.

## 2023-01-25 NOTE — TELEPHONE ENCOUNTER
Left voice mail with Dr. Gaxiola Person office re: stopping Eliquis 3 days before surgery on 2/10/23.

## 2023-02-01 ENCOUNTER — LAB ENCOUNTER (OUTPATIENT)
Dept: LAB | Age: 28
End: 2023-02-01
Attending: FAMILY MEDICINE
Payer: MEDICARE

## 2023-02-01 DIAGNOSIS — Z13.6 SCREENING FOR HEART DISEASE: ICD-10-CM

## 2023-02-01 DIAGNOSIS — Z01.818 PREOP EXAMINATION: ICD-10-CM

## 2023-02-01 DIAGNOSIS — G82.20 PARALYSIS OF BOTH LOWER LIMBS (HCC): ICD-10-CM

## 2023-02-01 DIAGNOSIS — J45.40 MODERATE PERSISTENT ASTHMA WITHOUT COMPLICATION: ICD-10-CM

## 2023-02-01 DIAGNOSIS — G95.0 SYRINGOMYELIA (HCC): ICD-10-CM

## 2023-02-01 DIAGNOSIS — F33.2 SEVERE EPISODE OF RECURRENT MAJOR DEPRESSIVE DISORDER, WITHOUT PSYCHOTIC FEATURES (HCC): ICD-10-CM

## 2023-02-01 DIAGNOSIS — R00.0 TACHYCARDIA: ICD-10-CM

## 2023-02-01 LAB
ALBUMIN SERPL-MCNC: 3.5 G/DL (ref 3.4–5)
ALBUMIN/GLOB SERPL: 0.8 {RATIO} (ref 1–2)
ALP LIVER SERPL-CCNC: 119 U/L
ALT SERPL-CCNC: 35 U/L
ANION GAP SERPL CALC-SCNC: 0 MMOL/L (ref 0–18)
APTT PPP: 24.5 SECONDS (ref 23.3–35.6)
AST SERPL-CCNC: 23 U/L (ref 15–37)
BASOPHILS # BLD AUTO: 0.06 X10(3) UL (ref 0–0.2)
BASOPHILS NFR BLD AUTO: 0.8 %
BILIRUB SERPL-MCNC: 0.2 MG/DL (ref 0.1–2)
BUN BLD-MCNC: 11 MG/DL (ref 7–18)
CALCIUM BLD-MCNC: 9.4 MG/DL (ref 8.5–10.1)
CHLORIDE SERPL-SCNC: 105 MMOL/L (ref 98–112)
CHOLEST SERPL-MCNC: 179 MG/DL (ref ?–200)
CO2 SERPL-SCNC: 32 MMOL/L (ref 21–32)
CREAT BLD-MCNC: 0.5 MG/DL
EOSINOPHIL # BLD AUTO: 0.32 X10(3) UL (ref 0–0.7)
EOSINOPHIL NFR BLD AUTO: 4.2 %
ERYTHROCYTE [DISTWIDTH] IN BLOOD BY AUTOMATED COUNT: 12.9 %
FASTING PATIENT LIPID ANSWER: YES
FASTING STATUS PATIENT QL REPORTED: YES
GFR SERPLBLD BASED ON 1.73 SQ M-ARVRAT: 142 ML/MIN/1.73M2 (ref 60–?)
GLOBULIN PLAS-MCNC: 4.2 G/DL (ref 2.8–4.4)
GLUCOSE BLD-MCNC: 90 MG/DL (ref 70–99)
HCT VFR BLD AUTO: 43.4 %
HDLC SERPL-MCNC: 34 MG/DL (ref 40–59)
HGB BLD-MCNC: 13.9 G/DL
IMM GRANULOCYTES # BLD AUTO: 0.02 X10(3) UL (ref 0–1)
IMM GRANULOCYTES NFR BLD: 0.3 %
INR BLD: 0.94 (ref 0.85–1.16)
LDLC SERPL CALC-MCNC: 114 MG/DL (ref ?–100)
LYMPHOCYTES # BLD AUTO: 2.46 X10(3) UL (ref 1–4)
LYMPHOCYTES NFR BLD AUTO: 32.4 %
MCH RBC QN AUTO: 28.1 PG (ref 26–34)
MCHC RBC AUTO-ENTMCNC: 32 G/DL (ref 31–37)
MCV RBC AUTO: 87.7 FL
MONOCYTES # BLD AUTO: 0.65 X10(3) UL (ref 0.1–1)
MONOCYTES NFR BLD AUTO: 8.6 %
NEUTROPHILS # BLD AUTO: 4.09 X10 (3) UL (ref 1.5–7.7)
NEUTROPHILS # BLD AUTO: 4.09 X10(3) UL (ref 1.5–7.7)
NEUTROPHILS NFR BLD AUTO: 53.7 %
NONHDLC SERPL-MCNC: 145 MG/DL (ref ?–130)
OSMOLALITY SERPL CALC.SUM OF ELEC: 283 MOSM/KG (ref 275–295)
PLATELET # BLD AUTO: 251 10(3)UL (ref 150–450)
POTASSIUM SERPL-SCNC: 4.1 MMOL/L (ref 3.5–5.1)
PROT SERPL-MCNC: 7.7 G/DL (ref 6.4–8.2)
PROTHROMBIN TIME: 12.6 SECONDS (ref 11.6–14.8)
RBC # BLD AUTO: 4.95 X10(6)UL
SODIUM SERPL-SCNC: 137 MMOL/L (ref 136–145)
T4 FREE SERPL-MCNC: 0.9 NG/DL (ref 0.8–1.7)
TRIGL SERPL-MCNC: 173 MG/DL (ref 30–149)
TSI SER-ACNC: 0.4 MIU/ML (ref 0.36–3.74)
VIT B12 SERPL-MCNC: 279 PG/ML (ref 193–986)
VLDLC SERPL CALC-MCNC: 30 MG/DL (ref 0–30)
WBC # BLD AUTO: 7.6 X10(3) UL (ref 4–11)

## 2023-02-01 PROCEDURE — 85610 PROTHROMBIN TIME: CPT

## 2023-02-01 PROCEDURE — 36415 COLL VENOUS BLD VENIPUNCTURE: CPT

## 2023-02-01 PROCEDURE — 85730 THROMBOPLASTIN TIME PARTIAL: CPT

## 2023-02-01 PROCEDURE — 84439 ASSAY OF FREE THYROXINE: CPT

## 2023-02-01 PROCEDURE — 84443 ASSAY THYROID STIM HORMONE: CPT

## 2023-02-01 PROCEDURE — 80061 LIPID PANEL: CPT

## 2023-02-01 PROCEDURE — 85025 COMPLETE CBC W/AUTO DIFF WBC: CPT

## 2023-02-01 PROCEDURE — 80053 COMPREHEN METABOLIC PANEL: CPT

## 2023-02-01 PROCEDURE — 82607 VITAMIN B-12: CPT

## 2023-02-03 ENCOUNTER — EXTERNAL RECORD (OUTPATIENT)
Dept: HEALTH INFORMATION MANAGEMENT | Facility: OTHER | Age: 28
End: 2023-02-03

## 2023-02-17 ENCOUNTER — EXTERNAL RECORD (OUTPATIENT)
Dept: HEALTH INFORMATION MANAGEMENT | Facility: OTHER | Age: 28
End: 2023-02-17

## 2023-02-22 ENCOUNTER — PRIOR ORIGINAL RECORDS (OUTPATIENT)
Dept: HEALTH INFORMATION MANAGEMENT | Facility: OTHER | Age: 28
End: 2023-02-22

## 2023-03-06 ENCOUNTER — TELEPHONE (OUTPATIENT)
Dept: REHABILITATION | Age: 28
End: 2023-03-06

## 2023-03-06 DIAGNOSIS — G89.0 CENTRAL PAIN SYNDROME: Primary | ICD-10-CM

## 2023-03-06 DIAGNOSIS — G82.22 PARAPLEGIA, INCOMPLETE (CMD): ICD-10-CM

## 2023-03-06 RX ORDER — LIDOCAINE 50 MG/G
2 PATCH TOPICAL ONCE
Status: DISCONTINUED | OUTPATIENT
Start: 2023-03-06 | End: 2023-08-09

## 2023-03-07 DIAGNOSIS — J45.40 MODERATE PERSISTENT ASTHMA WITHOUT COMPLICATION: ICD-10-CM

## 2023-03-07 DIAGNOSIS — Z79.899 ENCOUNTER FOR LONG-TERM CURRENT USE OF MEDICATION: ICD-10-CM

## 2023-03-07 RX ORDER — ALBUTEROL SULFATE 90 UG/1
2 AEROSOL, METERED RESPIRATORY (INHALATION) EVERY 6 HOURS PRN
Qty: 18 G | Refills: 0 | Status: SHIPPED | OUTPATIENT
Start: 2023-03-07

## 2023-03-14 PROBLEM — E55.9 VITAMIN D DEFICIENCY: Status: ACTIVE | Noted: 2023-03-14

## 2023-03-14 PROBLEM — G95.0: Status: ACTIVE | Noted: 2023-03-14

## 2023-03-14 PROBLEM — K21.9 GASTROESOPHAGEAL REFLUX DISEASE: Status: ACTIVE | Noted: 2023-03-14

## 2023-03-14 PROBLEM — M53.3 SACROCOCCYGEAL DISORDERS, NOT ELSEWHERE CLASSIFIED: Status: ACTIVE | Noted: 2023-03-14

## 2023-03-14 PROBLEM — M70.61 TROCHANTERIC BURSITIS, RIGHT HIP: Status: ACTIVE | Noted: 2023-03-14

## 2023-03-14 PROBLEM — G56.01 CARPAL TUNNEL SYNDROME, RIGHT UPPER LIMB: Status: ACTIVE | Noted: 2023-03-14

## 2023-03-14 PROBLEM — R25.2 CRAMP AND SPASM: Status: ACTIVE | Noted: 2023-03-14

## 2023-03-14 PROBLEM — G89.0 CENTRAL PAIN SYNDROME: Status: ACTIVE | Noted: 2023-03-14

## 2023-03-14 PROBLEM — M62.838 OTHER MUSCLE SPASM: Status: ACTIVE | Noted: 2023-03-14

## 2023-03-14 PROBLEM — N31.9 NEUROMUSCULAR DYSFUNCTION OF BLADDER: Status: ACTIVE | Noted: 2023-03-14

## 2023-03-14 PROBLEM — K59.2 NEUROGENIC BOWEL, NOT ELSEWHERE CLASSIFIED: Status: ACTIVE | Noted: 2023-03-14

## 2023-03-14 PROBLEM — I80.9 BLOOD CLOT ASSOCIATED WITH VEIN WALL INFLAMMATION: Status: ACTIVE | Noted: 2023-03-14

## 2023-03-14 PROBLEM — G82.20 PARAPLEGIA, UNSPECIFIED (CMD): Status: ACTIVE | Noted: 2023-03-14

## 2023-03-14 PROBLEM — R53.83 FATIGUE: Status: ACTIVE | Noted: 2023-03-14

## 2023-03-14 RX ORDER — TOLTERODINE 2 MG/1
2 CAPSULE, EXTENDED RELEASE ORAL DAILY
COMMUNITY
End: 2023-07-12

## 2023-03-14 RX ORDER — INDOMETHACIN 25 MG/1
25 CAPSULE ORAL
COMMUNITY

## 2023-03-14 RX ORDER — CALCIUM CARBONATE 500 MG/1
1 TABLET, CHEWABLE ORAL PRN
COMMUNITY

## 2023-03-14 RX ORDER — MORPHINE SULFATE 100 %
3 POWDER (GRAM) MISCELLANEOUS
COMMUNITY

## 2023-03-27 ENCOUNTER — TELEPHONE (OUTPATIENT)
Dept: FAMILY MEDICINE CLINIC | Facility: CLINIC | Age: 28
End: 2023-03-27

## 2023-03-27 NOTE — TELEPHONE ENCOUNTER
[place] [id number] [place] supplies. Placed in your red folder for review and signature.

## 2023-03-28 ENCOUNTER — PATIENT OUTREACH (OUTPATIENT)
Dept: FAMILY MEDICINE CLINIC | Facility: CLINIC | Age: 28
End: 2023-03-28

## 2023-03-28 NOTE — PROGRESS NOTES
LVM due for Medicare Annual Wellness Visit. Please call us back at [phone] or can schedule through [address]

## 2023-03-30 ENCOUNTER — TELEPHONE (OUTPATIENT)
Dept: FAMILY MEDICINE CLINIC | Facility: CLINIC | Age: 28
End: 2023-03-30

## 2023-03-30 NOTE — TELEPHONE ENCOUNTER
Recommend patient call surgeon's office. And/or go to the [facility] immediate care.

## 2023-03-30 NOTE — TELEPHONE ENCOUNTER
Patient states he noticed a \"dime size hole\" to abdomen on the incision where his baclofen pump was implanted. He does not know how long it has been there. He just today noticed it. He does not feel pain to that area d/t his injury. He said there was a little bloody discharge. I did advise he contact surgeon that implanted device. Please advise further.

## 2023-03-30 NOTE — TELEPHONE ENCOUNTER
Pt called and stated that he has skin breaking down on his left side of his stomach. Pt states it looks like a hole on his stomach. Pt is seeking advice.

## 2023-03-31 ENCOUNTER — TELEPHONE (OUTPATIENT)
Dept: NEUROSURGERY | Age: 28
End: 2023-03-31

## 2023-04-04 ENCOUNTER — TELEPHONE (OUTPATIENT)
Dept: NEUROSURGERY | Age: 28
End: 2023-04-04

## 2023-04-08 ENCOUNTER — EXTERNAL RECORD (OUTPATIENT)
Dept: HEALTH INFORMATION MANAGEMENT | Facility: OTHER | Age: 28
End: 2023-04-08

## 2023-04-19 ENCOUNTER — EXTERNAL RECORD (OUTPATIENT)
Dept: HEALTH INFORMATION MANAGEMENT | Facility: OTHER | Age: 28
End: 2023-04-19

## 2023-04-19 RX ORDER — PREGABALIN 300 MG/1
300 CAPSULE ORAL DAILY
Refills: 0 | OUTPATIENT
Start: 2023-04-19

## 2023-04-20 ENCOUNTER — EXTERNAL RECORD (OUTPATIENT)
Dept: HEALTH INFORMATION MANAGEMENT | Facility: OTHER | Age: 28
End: 2023-04-20

## 2023-04-20 ENCOUNTER — OFFICE VISIT (OUTPATIENT)
Dept: REHABILITATION | Age: 28
End: 2023-04-20

## 2023-04-20 VITALS
BODY MASS INDEX: 28.63 KG/M2 | WEIGHT: 200 LBS | HEIGHT: 70 IN | SYSTOLIC BLOOD PRESSURE: 108 MMHG | TEMPERATURE: 97.5 F | DIASTOLIC BLOOD PRESSURE: 70 MMHG | HEART RATE: 72 BPM

## 2023-04-20 DIAGNOSIS — G82.22 PARAPLEGIA, INCOMPLETE (CMD): Primary | ICD-10-CM

## 2023-04-20 DIAGNOSIS — Z99.3 WHEELCHAIR DEPENDENCE: ICD-10-CM

## 2023-04-20 PROCEDURE — 99213 OFFICE O/P EST LOW 20 MIN: CPT | Performed by: STUDENT IN AN ORGANIZED HEALTH CARE EDUCATION/TRAINING PROGRAM

## 2023-04-20 ASSESSMENT — ENCOUNTER SYMPTOMS
HEADACHES: 0
NAUSEA: 0
CHILLS: 0
FEVER: 0
SHORTNESS OF BREATH: 0
EYE DISCHARGE: 0
VOMITING: 0
WEAKNESS: 1
POLYDIPSIA: 0
CONFUSION: 0
DIZZINESS: 0
HALLUCINATIONS: 0
EYE REDNESS: 0
RHINORRHEA: 0
POLYPHAGIA: 0
WHEEZING: 0

## 2023-04-20 ASSESSMENT — PAIN SCALES - GENERAL: PAINLEVEL: 2

## 2023-04-21 ENCOUNTER — EXTERNAL RECORD (OUTPATIENT)
Dept: HEALTH INFORMATION MANAGEMENT | Facility: OTHER | Age: 28
End: 2023-04-21

## 2023-04-26 ENCOUNTER — TELEPHONE (OUTPATIENT)
Dept: FAMILY MEDICINE CLINIC | Facility: CLINIC | Age: 28
End: 2023-04-26

## 2023-04-26 NOTE — TELEPHONE ENCOUNTER
LMOM reminding pt due for Medicare Annual Wellness. Please call [phone] to schedule or you can schedule thru MyChart.

## 2023-05-03 ENCOUNTER — OFFICE VISIT (OUTPATIENT)
Dept: REHABILITATION | Age: 28
End: 2023-05-03

## 2023-05-03 VITALS
TEMPERATURE: 97.2 F | SYSTOLIC BLOOD PRESSURE: 122 MMHG | DIASTOLIC BLOOD PRESSURE: 88 MMHG | BODY MASS INDEX: 28.63 KG/M2 | HEART RATE: 127 BPM | HEIGHT: 70 IN | WEIGHT: 200 LBS

## 2023-05-03 DIAGNOSIS — R25.2 SPASTICITY: ICD-10-CM

## 2023-05-03 DIAGNOSIS — G82.22 PARAPLEGIA, INCOMPLETE (CMD): ICD-10-CM

## 2023-05-03 DIAGNOSIS — G89.0 CENTRAL PAIN SYNDROME: Primary | ICD-10-CM

## 2023-05-03 DIAGNOSIS — G82.20 PARAPLEGIA, UNSPECIFIED (CMD): ICD-10-CM

## 2023-05-03 PROCEDURE — 62370 ANL SP INF PMP W/MDREPRG&FIL: CPT | Performed by: PHYSICAL MEDICINE & REHABILITATION

## 2023-05-03 PROCEDURE — 99213 OFFICE O/P EST LOW 20 MIN: CPT | Performed by: PHYSICAL MEDICINE & REHABILITATION

## 2023-05-03 ASSESSMENT — PAIN SCALES - GENERAL: PAINLEVEL: 2

## 2023-05-05 ENCOUNTER — EXTERNAL RECORD (OUTPATIENT)
Dept: HEALTH INFORMATION MANAGEMENT | Facility: OTHER | Age: 28
End: 2023-05-05

## 2023-05-23 ENCOUNTER — CLINICAL ABSTRACT (OUTPATIENT)
Dept: HEALTH INFORMATION MANAGEMENT | Facility: OTHER | Age: 28
End: 2023-05-23

## 2023-07-12 RX ORDER — TOLTERODINE 2 MG/1
2 CAPSULE, EXTENDED RELEASE ORAL DAILY
Qty: 90 CAPSULE | Refills: 1 | Status: SHIPPED | OUTPATIENT
Start: 2023-07-12 | End: 2023-08-09 | Stop reason: SDUPTHER

## 2023-07-12 RX ORDER — DULOXETIN HYDROCHLORIDE 60 MG/1
60 CAPSULE, DELAYED RELEASE ORAL DAILY
Qty: 90 CAPSULE | Refills: 1 | Status: SHIPPED | OUTPATIENT
Start: 2023-07-12

## 2023-07-13 ENCOUNTER — OFFICE VISIT (OUTPATIENT)
Dept: REHABILITATION | Age: 28
End: 2023-07-13

## 2023-07-13 VITALS
WEIGHT: 200 LBS | HEART RATE: 99 BPM | BODY MASS INDEX: 28.63 KG/M2 | HEIGHT: 70 IN | TEMPERATURE: 97.9 F | SYSTOLIC BLOOD PRESSURE: 113 MMHG | DIASTOLIC BLOOD PRESSURE: 76 MMHG

## 2023-07-13 DIAGNOSIS — R25.2 CRAMP AND SPASM: Primary | ICD-10-CM

## 2023-07-13 DIAGNOSIS — G89.0 CENTRAL PAIN SYNDROME: ICD-10-CM

## 2023-07-13 PROCEDURE — 99213 OFFICE O/P EST LOW 20 MIN: CPT | Performed by: PHYSICAL MEDICINE & REHABILITATION

## 2023-07-13 PROCEDURE — 62370 ANL SP INF PMP W/MDREPRG&FIL: CPT | Performed by: PHYSICAL MEDICINE & REHABILITATION

## 2023-07-28 ENCOUNTER — EXTERNAL RECORD (OUTPATIENT)
Dept: HEALTH INFORMATION MANAGEMENT | Facility: OTHER | Age: 28
End: 2023-07-28

## 2023-08-09 ENCOUNTER — TELEPHONE (OUTPATIENT)
Dept: REHABILITATION | Age: 28
End: 2023-08-09

## 2023-08-09 RX ORDER — LIDOCAINE 4 G/G
2 PATCH TOPICAL EVERY 24 HOURS
Qty: 120 PATCH | Refills: 0 | Status: SHIPPED | OUTPATIENT
Start: 2023-08-09 | End: 2023-09-28 | Stop reason: SDUPTHER

## 2023-08-09 RX ORDER — TOLTERODINE 2 MG/1
2 CAPSULE, EXTENDED RELEASE ORAL DAILY
Qty: 90 CAPSULE | Refills: 1 | Status: SHIPPED | OUTPATIENT
Start: 2023-08-09 | End: 2023-09-21

## 2023-08-23 ENCOUNTER — MED REC SCAN ONLY (OUTPATIENT)
Dept: FAMILY MEDICINE CLINIC | Facility: CLINIC | Age: 28
End: 2023-08-23

## 2023-09-15 ENCOUNTER — TELEPHONE (OUTPATIENT)
Dept: PHYSICAL MEDICINE AND REHAB | Age: 28
End: 2023-09-15

## 2023-09-20 ENCOUNTER — OFFICE VISIT (OUTPATIENT)
Dept: REHABILITATION | Age: 28
End: 2023-09-20

## 2023-09-20 VITALS
DIASTOLIC BLOOD PRESSURE: 83 MMHG | WEIGHT: 200 LBS | HEART RATE: 90 BPM | RESPIRATION RATE: 19 BRPM | SYSTOLIC BLOOD PRESSURE: 122 MMHG | HEIGHT: 70 IN | BODY MASS INDEX: 28.63 KG/M2 | TEMPERATURE: 97.8 F

## 2023-09-20 DIAGNOSIS — R25.2 CRAMP AND SPASM: Primary | ICD-10-CM

## 2023-09-20 DIAGNOSIS — G82.20 PARAPLEGIA, UNSPECIFIED (CMD): ICD-10-CM

## 2023-09-20 PROCEDURE — 62370 ANL SP INF PMP W/MDREPRG&FIL: CPT | Performed by: PHYSICAL MEDICINE & REHABILITATION

## 2023-09-20 RX ORDER — DEXTROAMPHETAMINE SACCHARATE, AMPHETAMINE ASPARTATE MONOHYDRATE, DEXTROAMPHETAMINE SULFATE AND AMPHETAMINE SULFATE 5; 5; 5; 5 MG/1; MG/1; MG/1; MG/1
CAPSULE, EXTENDED RELEASE ORAL DAILY
COMMUNITY
Start: 2023-09-05

## 2023-09-20 ASSESSMENT — PAIN SCALES - GENERAL: PAINLEVEL: 3

## 2023-09-21 RX ORDER — FESOTERODINE FUMARATE 4 MG/1
4 TABLET, EXTENDED RELEASE ORAL DAILY
Qty: 30 TABLET | Refills: 3 | Status: SHIPPED | OUTPATIENT
Start: 2023-09-21 | End: 2023-10-03 | Stop reason: ALTCHOICE

## 2023-09-27 ENCOUNTER — MED REC SCAN ONLY (OUTPATIENT)
Dept: FAMILY MEDICINE CLINIC | Facility: CLINIC | Age: 28
End: 2023-09-27

## 2023-09-27 ENCOUNTER — TELEPHONE (OUTPATIENT)
Dept: FAMILY MEDICINE CLINIC | Facility: CLINIC | Age: 28
End: 2023-09-27

## 2023-09-27 NOTE — TELEPHONE ENCOUNTER
Faxed [employer] Standard Written Order form to Mayte Sanchez ar [phone] on date 9/27/2023.  Closing encounter

## 2023-09-28 ENCOUNTER — TELEPHONE (OUTPATIENT)
Dept: REHABILITATION | Age: 28
End: 2023-09-28

## 2023-09-28 RX ORDER — LIDOCAINE 4 G/G
2 PATCH TOPICAL EVERY 24 HOURS
Qty: 120 PATCH | Refills: 0 | Status: SHIPPED | OUTPATIENT
Start: 2023-09-28 | End: 2023-11-27

## 2023-09-28 RX ORDER — PREGABALIN 300 MG/1
300 CAPSULE ORAL 2 TIMES DAILY
Qty: 180 CAPSULE | Refills: 1 | Status: SHIPPED | OUTPATIENT
Start: 2023-09-28

## 2023-10-03 DIAGNOSIS — R35.0 URINARY FREQUENCY: Primary | ICD-10-CM

## 2023-10-03 RX ORDER — TOLTERODINE 4 MG/1
4 CAPSULE, EXTENDED RELEASE ORAL DAILY
Qty: 30 CAPSULE | Refills: 0 | Status: SHIPPED | OUTPATIENT
Start: 2023-10-03 | End: 2023-10-30

## 2023-10-17 DIAGNOSIS — Z79.899 ENCOUNTER FOR LONG-TERM CURRENT USE OF MEDICATION: ICD-10-CM

## 2023-10-17 DIAGNOSIS — J45.40 MODERATE PERSISTENT ASTHMA WITHOUT COMPLICATION: ICD-10-CM

## 2023-10-17 RX ORDER — ALBUTEROL SULFATE 90 UG/1
2 AEROSOL, METERED RESPIRATORY (INHALATION) EVERY 6 HOURS PRN
Qty: 1 EACH | Refills: 0 | Status: SHIPPED | OUTPATIENT
Start: 2023-10-17

## 2023-10-17 NOTE — TELEPHONE ENCOUNTER
Please call patient and inform him the albuterol was refilled. Please have patient schedule appointment to see me for his Medicare annual wellness visit and follow-up on asthma.

## 2023-10-23 DIAGNOSIS — Z79.899 ENCOUNTER FOR LONG-TERM CURRENT USE OF MEDICATION: ICD-10-CM

## 2023-10-23 DIAGNOSIS — J45.40 MODERATE PERSISTENT ASTHMA WITHOUT COMPLICATION: ICD-10-CM

## 2023-10-24 RX ORDER — FLUTICASONE FUROATE AND VILANTEROL 200; 25 UG/1; UG/1
1 POWDER RESPIRATORY (INHALATION) DAILY
Qty: 180 EACH | Refills: 0 | Status: SHIPPED | OUTPATIENT
Start: 2023-10-24

## 2023-10-24 NOTE — TELEPHONE ENCOUNTER
Medication(s) to Refill:   Requested Prescriptions     Pending Prescriptions Disp Refills    fluticasone furoate-vilanterol 200-25 MCG/ACT Inhalation Aerosol Powder, Breath Activated [Pharmacy Med Name: Zan Blakelyisra 200-25 MCG INH] 180 each 3     Sig: TAKE 1 PUFF BY MOUTH EVERY DAY     Last Time Medication was Filled:  9/30/22    Recent Visits  Date Type Provider Dept   01/25/23 Office Visit DO Juwan Leung [address] [place] [address]     Future Appointments  No visits were found

## 2023-10-24 NOTE — TELEPHONE ENCOUNTER
Please call patient and inform him the Cristino Race was refilled for a 3-month supply. Please have patient schedule appointment to see me for his Medicare wellness visit and follow-up on asthma.

## 2023-10-30 RX ORDER — TOLTERODINE 4 MG/1
4 CAPSULE, EXTENDED RELEASE ORAL DAILY
Qty: 90 CAPSULE | Refills: 1 | Status: SHIPPED | OUTPATIENT
Start: 2023-10-30

## 2023-11-04 RX ORDER — LIDOCAINE 50 MG/G
PATCH TOPICAL
Qty: 60 PATCH | Refills: 4 | Status: SHIPPED | OUTPATIENT
Start: 2023-11-04

## 2023-11-08 ENCOUNTER — TELEPHONE (OUTPATIENT)
Dept: FAMILY MEDICINE CLINIC | Facility: CLINIC | Age: 28
End: 2023-11-08

## 2023-11-08 ENCOUNTER — OFFICE VISIT (OUTPATIENT)
Dept: FAMILY MEDICINE CLINIC | Facility: CLINIC | Age: 28
End: 2023-11-08
Payer: MEDICARE

## 2023-11-08 VITALS
HEART RATE: 98 BPM | SYSTOLIC BLOOD PRESSURE: 120 MMHG | TEMPERATURE: 97 F | DIASTOLIC BLOOD PRESSURE: 86 MMHG | OXYGEN SATURATION: 99 %

## 2023-11-08 DIAGNOSIS — F90.0 ATTENTION DEFICIT HYPERACTIVITY DISORDER (ADHD), PREDOMINANTLY INATTENTIVE TYPE: ICD-10-CM

## 2023-11-08 DIAGNOSIS — Z76.89 ENCOUNTER FOR NEW MEDICATION PRESCRIPTION: ICD-10-CM

## 2023-11-08 DIAGNOSIS — G95.0 SYRINGOMYELIA (HCC): ICD-10-CM

## 2023-11-08 DIAGNOSIS — E66.09 OBESITY DUE TO EXCESS CALORIES, UNSPECIFIED CLASSIFICATION, UNSPECIFIED WHETHER SERIOUS COMORBIDITY PRESENT: ICD-10-CM

## 2023-11-08 DIAGNOSIS — N62 GYNECOMASTIA, MALE: ICD-10-CM

## 2023-11-08 DIAGNOSIS — N31.9 NEUROGENIC BLADDER: ICD-10-CM

## 2023-11-08 DIAGNOSIS — G82.20 PARALYSIS OF BOTH LOWER LIMBS (HCC): ICD-10-CM

## 2023-11-08 DIAGNOSIS — Z00.00 MEDICARE ANNUAL WELLNESS VISIT, SUBSEQUENT: Primary | ICD-10-CM

## 2023-11-08 DIAGNOSIS — E78.2 MIXED HYPERLIPIDEMIA: ICD-10-CM

## 2023-11-08 DIAGNOSIS — J45.20 MILD INTERMITTENT ASTHMA IN ADULT WITHOUT COMPLICATION: ICD-10-CM

## 2023-11-08 DIAGNOSIS — F33.1 MAJOR DEPRESSIVE DISORDER, RECURRENT, MODERATE (HCC): ICD-10-CM

## 2023-11-08 DIAGNOSIS — F17.299 OTHER TOBACCO PRODUCT NICOTINE DEPENDENCE WITH NICOTINE-INDUCED DISORDER: ICD-10-CM

## 2023-11-08 PROBLEM — F39 MOOD DISORDER (HCC): Status: RESOLVED | Noted: 2019-08-24 | Resolved: 2023-11-08

## 2023-11-08 PROBLEM — R94.31 ABNORMAL ECG: Status: RESOLVED | Noted: 2022-10-05 | Resolved: 2023-11-08

## 2023-11-08 PROBLEM — Z72.89 DELIBERATE SELF-CUTTING: Status: RESOLVED | Noted: 2023-01-25 | Resolved: 2023-11-08

## 2023-11-08 PROBLEM — F17.200 NICOTINE DEPENDENCE: Status: ACTIVE | Noted: 2023-11-08

## 2023-11-08 PROBLEM — K59.2 NEUROGENIC BOWEL, NOT ELSEWHERE CLASSIFIED: Status: ACTIVE | Noted: 2023-03-14

## 2023-11-08 PROBLEM — R00.0 TACHYCARDIA: Status: RESOLVED | Noted: 2023-01-25 | Resolved: 2023-11-08

## 2023-11-08 PROBLEM — G89.0 CENTRAL PAIN SYNDROME: Status: ACTIVE | Noted: 2023-03-14

## 2023-11-08 PROBLEM — J45.40 MODERATE PERSISTENT ASTHMA WITHOUT COMPLICATION: Status: RESOLVED | Noted: 2019-08-08 | Resolved: 2023-11-08

## 2023-11-08 PROBLEM — F33.2 SEVERE EPISODE OF RECURRENT MAJOR DEPRESSIVE DISORDER, WITHOUT PSYCHOTIC FEATURES (HCC): Status: RESOLVED | Noted: 2023-01-25 | Resolved: 2023-11-08

## 2023-11-08 PROBLEM — F39 MOOD DISORDER: Status: RESOLVED | Noted: 2019-08-24 | Resolved: 2023-11-08

## 2023-11-08 PROBLEM — J45.40 MODERATE PERSISTENT ASTHMA WITHOUT COMPLICATION (HCC): Status: RESOLVED | Noted: 2019-08-08 | Resolved: 2023-11-08

## 2023-11-08 PROBLEM — D68.9 COAGULATION DEFECT (HCC): Status: RESOLVED | Noted: 2023-01-25 | Resolved: 2023-11-08

## 2023-11-08 RX ORDER — TROSPIUM CHLORIDE 20 MG/1
20 TABLET, FILM COATED ORAL EVERY 12 HOURS
COMMUNITY
Start: 2023-10-12

## 2023-11-08 RX ORDER — DEXTROAMPHETAMINE SACCHARATE, AMPHETAMINE ASPARTATE MONOHYDRATE, DEXTROAMPHETAMINE SULFATE AND AMPHETAMINE SULFATE 6.25; 6.25; 6.25; 6.25 MG/1; MG/1; MG/1; MG/1
25 CAPSULE, EXTENDED RELEASE ORAL EVERY MORNING
COMMUNITY
Start: 2023-11-08

## 2023-11-08 RX ORDER — NICOTINE 21 MG/24HR
1 PATCH, TRANSDERMAL 24 HOURS TRANSDERMAL EVERY 24 HOURS
Qty: 42 PATCH | Refills: 0 | Status: SHIPPED | OUTPATIENT
Start: 2023-11-08 | End: 2023-12-20

## 2023-11-08 RX ORDER — NICOTINE 21 MG/24HR
1 PATCH, TRANSDERMAL 24 HOURS TRANSDERMAL EVERY 24 HOURS
Qty: 28 PATCH | Refills: 0 | Status: SHIPPED | OUTPATIENT
Start: 2023-12-10 | End: 2024-01-07

## 2023-11-08 NOTE — TELEPHONE ENCOUNTER
Please call patient's rehab doctor's office, Dr. Ronald Clements and ask them if they are agreeable to us prescribing [facility] for him.

## 2023-11-08 NOTE — PATIENT INSTRUCTIONS
Riaz Davies's SCREENING SCHEDULE   Tests on this list are recommended by your physician but may not be covered, or covered at this frequency, by your insurer. Please check with your insurance carrier before scheduling to verify coverage. PREVENTATIVE SERVICES FREQUENCY &  COVERAGE DETAILS LAST COMPLETION DATE   Diabetes Screening    Fasting Blood Sugar / Glucose    One screening every 12 months if never tested or if previously tested but not diagnosed with pre-diabetes   One screening every 6 months if diagnosed with pre-diabetes Lab Results   Component Value Date    GLU 90 02/01/2023        Cardiovascular Disease Screening    Lipid Panel  Cholesterol  Lipoprotein (HDL)  Triglycerides Covered every 5 years for all Medicare beneficiaries without apparent signs or symptoms of cardiovascular disease Lab Results   Component Value Date    CHOLEST 179 02/01/2023    HDL 34 (L) 02/01/2023     (H) 02/01/2023    TRIG 173 (H) 02/01/2023         Electrocardiogram (EKG)   Covered if needed at Welcome to Medicare, and non-screening if indicated for medical reasons -      Ultrasound Screening for Abdominal Aortic Aneurysm (AAA) Covered once in a lifetime for one of the following risk factors    Men who are 73-68 years old and have ever smoked    Anyone with a family history -     Colorectal Cancer Screening  Covered for ages 52-80; only need ONE of the following:    Colonoscopy   Covered every 10 years    Covered every 2 years if patient is at high risk or previous colonoscopy was abnormal -    No recommendations at this time    Flexible Sigmoidoscopy   Covered every 4 years -    Fecal Occult Blood Test Covered annually -   Prostate Cancer Screening    Prostate-Specific Antigen (PSA) Annually No results found for: \"PSA\"  There are no preventive care reminders to display for this patient.    Immunizations    Influenza Covered once per flu season  Please get every year -  Influenza Vaccine(1) due on 10/01/2023 Pneumococcal Each vaccine (Oekgyzc91 & [id number]) covered once after 65 Prevnar 13: -    [id number]: -     Pneumococcal Vaccination(1 - PCV) Never done    Hepatitis B One screening covered for patients with certain risk factors   -  No recommendations at this time    Tetanus Toxoid Not covered by Medicare Part B unless medically necessary (cut with metal); may be covered with your pharmacy prescription benefits -    Tetanus, Diptheria and Pertusis TD and TDaP Not covered by Medicare Part B -  No recommendations at this time    Zoster Not covered by Medicare Part B; may be covered with your pharmacy  prescription benefits -  No recommendations at this time     Annual Monitoring of Persistent Medications (ACE/ARB, digoxin diuretics, anticonvulsants)    Potassium Annually Lab Results   Component Value Date    K 4.1 02/01/2023         Creatinine   Annually Lab Results   Component Value Date    CREATSERUM 0.50 (L) 02/01/2023         BUN Annually Lab Results   Component Value Date    BUN 11 02/01/2023       Drug Serum Conc Annually No results found for: \"DIGOXIN\", \"DIG\", \"VALP\"         Riaz Davies's SCREENING SCHEDULE   Tests on this list are recommended by your physician but may not be covered, or covered at this frequency, by your insurer. Please check with your insurance carrier before scheduling to verify coverage.    PREVENTATIVE SERVICES FREQUENCY &  COVERAGE DETAILS LAST COMPLETION DATE   Diabetes Screening    Fasting Blood Sugar / Glucose    One screening every 12 months if never tested or if previously tested but not diagnosed with pre-diabetes   One screening every 6 months if diagnosed with pre-diabetes Lab Results   Component Value Date    GLU 90 02/01/2023        Cardiovascular Disease Screening    Lipid Panel  Cholesterol  Lipoprotein (HDL)  Triglycerides Covered every 5 years for all Medicare beneficiaries without apparent signs or symptoms of cardiovascular disease Lab Results   Component Value Date    CHOLEST 179 02/01/2023    HDL 34 (L) 02/01/2023     (H) 02/01/2023    TRIG 173 (H) 02/01/2023         Electrocardiogram (EKG)   Covered if needed at Welcome to Medicare, and non-screening if indicated for medical reasons -      Ultrasound Screening for Abdominal Aortic Aneurysm (AAA) Covered once in a lifetime for one of the following risk factors    Men who are 73-68 years old and have ever smoked    Anyone with a family history -     Colorectal Cancer Screening  Covered for ages 52-80; only need ONE of the following:    Colonoscopy   Covered every 10 years    Covered every 2 years if patient is at high risk or previous colonoscopy was abnormal -    No recommendations at this time    Flexible Sigmoidoscopy   Covered every 4 years -    Fecal Occult Blood Test Covered annually -   Prostate Cancer Screening    Prostate-Specific Antigen (PSA) Annually No results found for: \"PSA\"  There are no preventive care reminders to display for this patient.    Immunizations    Influenza Covered once per flu season  Please get every year -  Influenza Vaccine(1) due on 10/01/2023    Pneumococcal Each vaccine ([id number] & [id number]) covered once after 65 Prevnar 13: -    [id number]: -     Pneumococcal Vaccination(1 - PCV) Never done    Hepatitis B One screening covered for patients with certain risk factors   -  No recommendations at this time    Tetanus Toxoid Not covered by Medicare Part B unless medically necessary (cut with metal); may be covered with your pharmacy prescription benefits -    Tetanus, Diptheria and Pertusis TD and TDaP Not covered by Medicare Part B -  No recommendations at this time    Zoster Not covered by Medicare Part B; may be covered with your pharmacy  prescription benefits -  No recommendations at this time     Annual Monitoring of Persistent Medications (ACE/ARB, digoxin diuretics, anticonvulsants)    Potassium Annually Lab Results   Component Value Date    K 4.1 02/01/2023 Creatinine   Annually Lab Results   Component Value Date    CREATSERUM 0.50 (L) 02/01/2023         BUN Annually Lab Results   Component Value Date    BUN 11 02/01/2023       Drug Serum Conc Annually No results found for: \"DIGOXIN\", \"DIG\", \"VALP\"                           -Recommend work on finding a location to start swimming for exercise.

## 2023-11-09 ENCOUNTER — TELEPHONE (OUTPATIENT)
Dept: PHYSICAL MEDICINE AND REHAB | Age: 28
End: 2023-11-09

## 2023-11-09 NOTE — TELEPHONE ENCOUNTER
[phone]  DR Ivan Rick PM&R    Called the office and spoke to [facility] who will give the question to Dr Jami Rooney and have him or his representative call office back.   He is out of the office until Monday

## 2023-11-13 NOTE — TELEPHONE ENCOUNTER
Outgoing call made to patient, gave backline number to patient's mother for him to call me back.   (I did speak with Dr. Jami Rooney the other day)

## 2023-11-13 NOTE — TELEPHONE ENCOUNTER
Spoke to Tiera and gave this message asking Dr Ian Toro to call office to talk to Dr Baylee Garcia and she said that Dr Ian Toro has already spoken to Dr Baylee Garcia 4 days ago. Please advise.

## 2023-11-14 NOTE — TELEPHONE ENCOUNTER
Incoming call received back from patient. Discussed with patient that medications such as Leonidas Raul would not be recommended to assist with attaining weight loss due to possible adverse effects on bowels, patient already known to have neurogenic bowel due to history of spinal cord injury. Would recommend trying phentermine after weaning down on use of nicotine, patient to follow-up in approximately 2 months for this. Also discussed swimming, recommend patient obtain swimming instruction from a professional who teaches swimming to patients with spinal cord injuries, also discussed with patient that he will need a LifeVest when swimming.

## 2023-11-15 DIAGNOSIS — Z79.899 ENCOUNTER FOR LONG-TERM CURRENT USE OF MEDICATION: ICD-10-CM

## 2023-11-15 DIAGNOSIS — J45.40 MODERATE PERSISTENT ASTHMA WITHOUT COMPLICATION: ICD-10-CM

## 2023-11-16 RX ORDER — ALBUTEROL SULFATE 90 UG/1
2 AEROSOL, METERED RESPIRATORY (INHALATION) EVERY 6 HOURS PRN
Qty: 8.5 EACH | Refills: 0 | Status: SHIPPED | OUTPATIENT
Start: 2023-11-16

## 2023-11-19 PROBLEM — R94.31 RIGHT AXIS DEVIATION: Status: RESOLVED | Noted: 2022-10-05 | Resolved: 2023-11-19

## 2023-11-27 ENCOUNTER — TELEPHONE (OUTPATIENT)
Dept: PHYSICAL MEDICINE AND REHAB | Age: 28
End: 2023-11-27

## 2023-11-29 ENCOUNTER — APPOINTMENT (OUTPATIENT)
Dept: PHYSICAL MEDICINE AND REHAB | Age: 28
End: 2023-11-29

## 2023-11-29 VITALS
RESPIRATION RATE: 18 BRPM | SYSTOLIC BLOOD PRESSURE: 130 MMHG | TEMPERATURE: 97.8 F | DIASTOLIC BLOOD PRESSURE: 78 MMHG | HEART RATE: 117 BPM

## 2023-11-29 DIAGNOSIS — M62.838 OTHER MUSCLE SPASM: Primary | ICD-10-CM

## 2023-11-29 DIAGNOSIS — G82.20 PARAPLEGIA, UNSPECIFIED (CMD): ICD-10-CM

## 2023-11-29 PROCEDURE — 62370 ANL SP INF PMP W/MDREPRG&FIL: CPT | Performed by: PHYSICAL MEDICINE & REHABILITATION

## 2023-11-29 ASSESSMENT — PAIN SCALES - GENERAL: PAINLEVEL: 3

## 2023-12-11 DIAGNOSIS — Z79.899 ENCOUNTER FOR LONG-TERM CURRENT USE OF MEDICATION: ICD-10-CM

## 2023-12-11 DIAGNOSIS — Z76.89 ENCOUNTER FOR NEW MEDICATION PRESCRIPTION: ICD-10-CM

## 2023-12-11 DIAGNOSIS — F17.299 OTHER TOBACCO PRODUCT NICOTINE DEPENDENCE WITH NICOTINE-INDUCED DISORDER: ICD-10-CM

## 2023-12-11 DIAGNOSIS — J45.40 MODERATE PERSISTENT ASTHMA WITHOUT COMPLICATION: ICD-10-CM

## 2023-12-12 RX ORDER — ALBUTEROL SULFATE 90 UG/1
2 AEROSOL, METERED RESPIRATORY (INHALATION) EVERY 6 HOURS PRN
Qty: 8.5 EACH | Refills: 0 | Status: SHIPPED | OUTPATIENT
Start: 2023-12-12

## 2023-12-12 RX ORDER — NICOTINE 21 MG/24HR
PATCH, TRANSDERMAL 24 HOURS TRANSDERMAL
Qty: 28 PATCH | Refills: 0 | OUTPATIENT
Start: 2023-12-12

## 2023-12-12 NOTE — TELEPHONE ENCOUNTER
Requesting refill on albuterol inhaler and nicotine patch. Albuterol refilled. Rx for nicotine patch denied. Refill available at pharmacy.

## 2023-12-18 DIAGNOSIS — Z76.89 ENCOUNTER FOR NEW MEDICATION PRESCRIPTION: ICD-10-CM

## 2023-12-18 DIAGNOSIS — F17.299 OTHER TOBACCO PRODUCT NICOTINE DEPENDENCE WITH NICOTINE-INDUCED DISORDER: ICD-10-CM

## 2023-12-18 RX ORDER — NICOTINE 21 MG/24HR
1 PATCH, TRANSDERMAL 24 HOURS TRANSDERMAL EVERY 24 HOURS
Qty: 42 PATCH | Refills: 0 | Status: SHIPPED | OUTPATIENT
Start: 2023-12-18 | End: 2024-01-29

## 2024-01-02 DIAGNOSIS — J45.40 MODERATE PERSISTENT ASTHMA WITHOUT COMPLICATION: ICD-10-CM

## 2024-01-02 DIAGNOSIS — Z79.899 ENCOUNTER FOR LONG-TERM CURRENT USE OF MEDICATION: ICD-10-CM

## 2024-01-02 RX ORDER — ALBUTEROL SULFATE 90 UG/1
2 AEROSOL, METERED RESPIRATORY (INHALATION) EVERY 6 HOURS PRN
Qty: 8.5 EACH | Refills: 0 | Status: SHIPPED | OUTPATIENT
Start: 2024-01-02

## 2024-01-04 ENCOUNTER — MED REC SCAN ONLY (OUTPATIENT)
Dept: FAMILY MEDICINE CLINIC | Facility: CLINIC | Age: 29
End: 2024-01-04

## 2024-01-22 PROBLEM — J45.20 MILD INTERMITTENT ASTHMA WITHOUT COMPLICATION: Status: ACTIVE | Noted: 2024-01-22

## 2024-01-22 PROBLEM — F33.1 MAJOR DEPRESSIVE DISORDER, RECURRENT, MODERATE (HCC): Status: RESOLVED | Noted: 2023-01-25 | Resolved: 2024-01-22

## 2024-01-22 PROBLEM — F33.2 MAJOR DEPRESSIVE DISORDER, RECURRENT SEVERE WITHOUT PSYCHOTIC FEATURES (HCC): Status: ACTIVE | Noted: 2024-01-22

## 2024-01-22 PROBLEM — F17.200 NICOTINE USE DISORDER: Status: ACTIVE | Noted: 2024-01-22

## 2024-01-22 PROBLEM — J45.20 MILD INTERMITTENT ASTHMA WITHOUT COMPLICATION (HCC): Status: ACTIVE | Noted: 2024-01-22

## 2024-01-22 PROBLEM — E53.8 LOW SERUM VITAMIN B12: Status: ACTIVE | Noted: 2024-01-22

## 2024-01-22 NOTE — PROGRESS NOTES
[facility] video visit with live interactive synchronous audio and video    Riaz Davies verbally consents to a Virtual/Telephone Check-In service on 01/22/24.  Patient has been referred to the [facility] website at [email or web address] to review the yearly Consent to Treat document.  Patient understands and accepts financial responsibility for any deductible, co-insurance and/or co-pays associated with this service.        Please note that the following visit was completed using two-way, real-time interactive audio and/or video communication.  This has been done in good adelina to provide continuity of care in the best interest of the provider-patient relationship, due to the ongoing public health crisis/national emergency and because of restrictions of visitation.  There are limitations of this visit as no physical exam could be performed.  Every conscious effort was taken to allow for sufficient and adequate time.  This billing was spent on reviewing labs, medications, radiology tests and decision making.  Appropriate medical decision-making and tests are ordered as detailed in the plan of care above.    Time spent was 30 minutes, more than 50% of time was spent on counseling regarding medical conditions and treatment.  Rest of time was spent reviewing chart, and reviewing any pertinent blood work and radiology tests.       Riaz Davies is a 28 year old male.      Chief Complaint   Patient presents with    Eye Pain     Patient has pain in his right eye patient pain level is a one    Obesity    Hyperlipidemia         HPI:       Nicotine use disorder:  Patient reports that he was doing well with nicotine patch replacement, then he started vaping again and then started using the patch again.  Patient would like to do another month of the 7 mg nicotine patches.  Recommend follow-up in about 4 weeks.      Severe major depressive disorder:  Patient under the care of a psychiatrist.      Paralysis of both lower  limbs:  Patient has follow-up with his rehab specialist Dr. Galeano, as planned, patient has appointment scheduled he reports for 2/26/2024.    Mild intermittent asthma without complication:  Currently well-controlled and fairly asymptomatic.  Infrequently needs to use the albuterol.      Low serum vitamin B12:  Currently not on a supplement.          Current Outpatient Medications   Medication Sig Dispense Refill    nicotine 7 MG/24HR Transdermal Patch 24 Hr Place 1 patch onto the skin daily. 30 patch 0    albuterol 108 (90 Base) MCG/ACT Inhalation Aero Soln Inhale 2 puffs into the lungs every 6 (six) hours as needed for Wheezing or Shortness of Breath. 8.5 each 0    Amphetamine-Dextroamphet ER (ADDERALL XR) 25 MG Oral Capsule SR 24 Hr Take 1 capsule (25 mg total) by mouth every morning.      Bupivacaine HCl 0.5 % Injection Solution Intrathecal.      acetaminophen 325 MG Oral Tab Take 2 tablets (650 mg total) by mouth as needed.      apixaban 5 MG Oral Tab Take 1 tablet (5 mg total) by mouth 2 (two) times daily.      famotidine 20 MG Oral Tab Take 1 tablet (20 mg total) by mouth as needed.      pregabalin 300 MG Oral Cap Take 1 capsule (300 mg total) by mouth daily.      ARIPIPRAZOLE 2 MG Oral Tab TAKE 1 TABLET BY MOUTH EVERY DAY 30 tablet 0    DULOXETINE HCL 60 MG Oral Cap DR Particles TAKE 1 CAPSULE (60 MG TOTAL) BY MOUTH ONCE DAILY. WITH 20MG = 80MG DAILY 90 capsule 0    NON FORMULARY Suppository for Hemorrhoids as needed. OTC       baclofen 2 mcg/mL in sodium chloride 0.9 % for pain pump by Intrathecal route daily. Dosage unknown       morphINE Sulfate (PF) in Sodium Chloride IT infusion 0.85 mg by Intrathecal route daily.        lidocaine (LIDODERM) 5 % Apply Externally Patch Place 2 patches onto the skin daily. As needed  0    Bisacodyl 10 MG Rectal Suppos Place 1 suppository (10 mg total) rectally every other day.        Patient Active Problem List   Diagnosis    Heartburn    Unspecified constipation     Paralysis of both lower limbs (HCC)    History of spinal cord injury    Muscle spasm    Asthma in adult    Neurogenic bladder    Prolapsed internal hemorrhoids, grade 3    Attention deficit hyperactivity disorder (ADHD), predominantly inattentive type    Fracture of thoracic vertebra, T1-T6 with complete cord lesion    Encounter for long-term current use of medication    Presence of intrathecal pump    Presence of IVC filter    History of DVT (deep vein thrombosis)    Syringomyelia (HCC)    Neurogenic bowel, not elsewhere classified    Central pain syndrome    Nicotine dependence    Obesity due to excess calories    Gynecomastia, male    Mixed hyperlipidemia    Nicotine use disorder    Major depressive disorder, recurrent severe without psychotic features (HCC)    Low serum vitamin B12    Mild intermittent asthma without complication      Past Medical History:   Diagnosis Date    Altered mental status, unspecified altered mental status type 04/11/2019    Anal pain 10/28/2016    Asthma     Attention deficit hyperactivity disorder (ADHD), predominantly inattentive type 07/01/2017    Coagulation defect ([facility])     Apixaban 5 mg twice a day.    Coccygodynia 05/25/2017    Deliberate self-cutting     Ecthyma 02/06/2021    Episode of abnormal behavior     Fracture of thoracic vertebra, T1-T6 with complete cord lesion 04/09/2012    Heartburn 06/29/2012    History of spinal cord injury 09/19/2012    Major depressive disorder, single episode in full remission (HCC) 09/05/2012    Moderate persistent asthma without complication 08/08/2019    Mood disorder ([facility]) 08/24/2019    Neurogenic bladder 05/14/2013    Paralysis of both lower limbs (HCC) 09/19/2012    Presence of intrathecal pump 10/05/2022    Presence of IVC filter 10/05/2022    Prolapsed internal hemorrhoids, grade 3 10/28/2016    Right axis deviation     9/30/2022 ECG    Seizure disorder, status epilepticus, nonconvulsive (HCC)     Severe recurrent major depression  without psychotic features (HCC) 2022    Reconciled from care everywhere.    Suicide and self-inflicted poisoning by unspecified drug or medicinal substance(E950.5) 2011    Unspecified drug dependence, unspecified 2011    at [facility]    Unspecified site of spinal cord injury without evidence of spinal bone injury     spinal compression t4-t6    Varicella 10/04/2010      Social History:  Social History     Socioeconomic History    Marital status: Single   Tobacco Use    Smoking status: Former     Types: Cigarettes     Quit date: 2014     Years since quittin.0    Smokeless tobacco: Never   Vaping Use    Vaping Use: Every day    Substances: Nicotine, Flavoring    Devices: Pre-filled or refillable cartridge, Pre-filled pod   Substance and Sexual Activity    Alcohol use: Yes     Alcohol/week: 1.0 standard drink of alcohol     Types: 1 Shots of liquor per week     Comment: occ    Drug use: Not Currently     Frequency: 7.0 times per week     Types: Cannabis     Comment: medical marijuanna     Sexual activity: Not Currently   Other Topics Concern    Caffeine Concern No     Comment: 1 cup of daily    Exercise No     Comment: on hold    Seat Belt Yes        REVIEW OF SYSTEMS:   GENERAL: Overall feels well.  EYES: Denies changes in vision  HEENT: Denies sore throat or other upper respiratory symptoms.  LUNGS: Denies cough or shortness of breath  CARDIOVASCULAR: Denies chest pain or palpitations.  GI: Denies abdominal pain, nausea, vomiting, or diarrhea  MUSCULOSKELETAL: Denies any new muscle aches or joint pains.  NEURO: Denies headache or dizziness.  PSYCH: Denies SI/HI      EXAM:   Ht 5' 10\" (1.778 m)   Wt 210 lb (95.3 kg)   BMI 30.13 kg/m²   GENERAL: Pleasant.  Comfortably speaking in full sentences. Non-ill appearing.  HEENT: Eyes: EOMI.  Normal conjunctiva.  Nose: No nasal discharge  LUNGS: No cough during video visit.  No audible dyspnea.  No audible wheezing.  NEURO: Alert and oriented  x3  PSYCH: Normal affect.  Intonation of voice is normal.  No pressured speech.        Immunization History   Administered Date(s) Administered    Covid-19 Vaccine Pfizer 30 mcg/0.3 ml 08/03/2021, 08/30/2021    DTAP 05/05/1993, 05/05/1993, 07/28/1993, 07/28/1993, 09/29/1993, 09/29/1993, 08/25/1994, 08/25/1994, 05/05/1998    DTAP INFANRIX 05/05/1998    DTP 05/05/1993, 07/28/1993, 09/29/1993, 08/25/1994    FLULAVAL 6 months & older 0.5 ml Prefilled syringe (25434) 10/03/2018, 10/09/2019    FLUZONE 6 months and older PFS 0.5 ml (95071) 09/18/2012, 10/03/2018    Fluvirin, 3 Years & >, Im 01/21/2012    HEP B 02/23/1994    HEP B, Ped/Adol 05/05/1993, 07/28/1993, 02/23/1994, 02/25/1994    Hib, Unspecified Formulation 05/05/1993, 07/28/1993, 09/29/1993, 05/25/1994    IPV 05/05/1993, 05/05/1993, 07/28/1993, 07/28/1993, 08/25/1994, 08/25/1994, 05/05/1998, 05/05/1998    Influenza 09/18/2012, 09/18/2012, 10/03/2018, 10/09/2019    MMR 05/25/1994, 05/05/1998    Meningococcal-Menactra 05/17/2007    OPV 05/05/1993, 07/28/1993, 08/25/1994, 05/05/1998    TDAP 05/17/2007, 12/11/2019   Pended Date(s) Pended    FLULAVAL 6 months & older 0.5 ml Prefilled syringe (22026) 09/30/2022           DATA:    Cholesterol:     Lab Results   Component Value Date    CHOLEST 179 02/01/2023     Lab Results   Component Value Date    HDL 34 (L) 02/01/2023     Lab Results   Component Value Date    TRIG 173 (H) 02/01/2023     Lab Results   Component Value Date     (H) 02/01/2023     Lab Results   Component Value Date    AST 23 02/01/2023    AST 16 10/05/2022    AST 70 (H) 10/28/2020     Lab Results   Component Value Date    ALT 35 02/01/2023    ALT 31 10/05/2022    ALT 95 (H) 10/28/2020             ASSESSMENT AND PLAN:     Encounter Diagnoses   Name Primary?    Other tobacco product nicotine dependence with nicotine-induced disorder Yes    Nicotine use disorder     Major depressive disorder, recurrent severe without psychotic features (HCC)      Paralysis of both lower limbs (HCC)     Mild intermittent asthma without complication     Mixed hyperlipidemia     Low serum vitamin B12        1. Other tobacco product nicotine dependence with nicotine-induced disorder  2. Nicotine use disorder  Patient reports that he was doing well with nicotine patch replacement, then he started vaping again and then started using the patch again.  Patient would like to do another month of the 7 mg nicotine patches.  Recommend follow-up in about 4 weeks.    - nicotine 7 MG/24HR Transdermal Patch 24 Hr; Place 1 patch onto the skin daily.  Dispense: 30 patch; Refill: 0    3. Major depressive disorder, recurrent severe without psychotic features (HCC)  Patient to follow-up with his psychiatrist as planned.    - CBC With Differential With Platelet; Future  - Comp Metabolic Panel (14); Future  - Assay, Thyroid Stim Hormone; Future  - Free T4, (Free Thyroxine); Future  - Vitamin B12 [E]; Future    4. Paralysis of both lower limbs (HCC)  Patient to follow-up with his rehab specialist Dr. Galeano, as planned, patient has appointment scheduled he reports for 2/26/2024.    5. Mild intermittent asthma without complication  Currently well-controlled and fairly asymptomatic.  Continue albuterol as needed as directed.    - CBC With Differential With Platelet; Future    6. Mixed hyperlipidemia  Reevaluate, further recommendations pending results    - CBC With Differential With Platelet; Future  - Comp Metabolic Panel (14); Future  - Lipid Panel; Future  - Assay, Thyroid Stim Hormone; Future  - Free T4, (Free Thyroxine); Future    7. Low serum vitamin B12  Reevaluate, further recommendations pending results.    - Vitamin B12 [E]; Future        Orders Placed This Encounter   Procedures    CBC With Differential With Platelet    Comp Metabolic Panel (14)    Lipid Panel    Assay, Thyroid Stim Hormone    Free T4, (Free Thyroxine)    Vitamin B12 [E]       Meds & Refills for this Visit:  Requested  Prescriptions     Signed Prescriptions Disp Refills    nicotine 7 MG/24HR Transdermal Patch 24 Hr 30 patch 0     Sig: Place 1 patch onto the skin daily.       Return in about 4 weeks (around 2/19/2024) for Nicotine use disorder.

## 2024-01-24 ENCOUNTER — OFFICE VISIT (OUTPATIENT)
Dept: NEUROLOGY | Facility: CLINIC | Age: 29
End: 2024-01-24
Payer: MEDICARE

## 2024-01-24 VITALS — DIASTOLIC BLOOD PRESSURE: 78 MMHG | RESPIRATION RATE: 16 BRPM | HEART RATE: 94 BPM | SYSTOLIC BLOOD PRESSURE: 122 MMHG

## 2024-01-24 DIAGNOSIS — G40.109 SYMPTOMATIC LOCALIZATION-RELATED EPILEPSY (HCC): Primary | ICD-10-CM

## 2024-01-24 DIAGNOSIS — R46.89 EPISODE OF ABNORMAL BEHAVIOR: ICD-10-CM

## 2024-01-24 DIAGNOSIS — S24.111S SPINAL CORD INJURY AT T1-T6 LEVEL WITH COMPLETE SPINAL CORD LESION, SEQUELA (HCC): ICD-10-CM

## 2024-01-24 PROCEDURE — 99204 OFFICE O/P NEW MOD 45 MIN: CPT | Performed by: OTHER

## 2024-01-24 RX ORDER — SOLIFENACIN SUCCINATE 10 MG/1
10 TABLET, FILM COATED ORAL DAILY
COMMUNITY
Start: 2023-12-11

## 2024-01-24 RX ORDER — DIVALPROEX SODIUM 250 MG/1
TABLET, EXTENDED RELEASE ORAL
Qty: 120 TABLET | Refills: 0 | Status: SHIPPED | OUTPATIENT
Start: 2024-01-24

## 2024-01-24 NOTE — PATIENT INSTRUCTIONS
After your visit at the [place] office  today,  please direct any follow up questions or medication needs to the staff in our  [place] office so that your concerns may be promptly addressed.  We are available through Pow[employer] or at the numbers below:    The phone number is:   [phone] option #1    The fax number is:  [phone]    Your pharmacy should also send any requests electronically to the [place] office.     Refill policies:    Allow 2-3 business days for refills; controlled substances may take longer.  Contact your pharmacy at least 5 days prior to running out of medication and have them send an electronic request or submit request through the “request refill” option in your Powered account.  Refills are not addressed on weekends; covering physicians do not authorize routine medications on weekends.  No narcotics or controlled substances are refilled after noon on Fridays or by on call physicians.  By law, narcotics must be electronically prescribed.  A 30 day supply with no refills is the maximum allowed.  If your prescription is due for a refill, you may be due for a follow up appointment.  To best provide you care, patients receiving routine medications need to be seen at least once a year.  Patients receiving narcotic/controlled substance medications need to be seen at least once every 3 months.  In the event that your preferred pharmacy does not have the requested medication in stock (e.g. Backordered), it is your responsibility to find another pharmacy that has the requested medication available.  We will gladly send a new prescription to that pharmacy at your request.    Scheduling Tests:    If your physician has ordered radiology tests such as MRI or CT scans, please contact Central Scheduling at [phone] right away to schedule the test.  Once scheduled, the [facility] Centralized Referral Team will work with your insurance carrier to obtain pre-certification or prior authorization.   Depending on your insurance carrier, approval may take 3-10 days.  It is highly recommended patients assure they have received an authorization before having a test performed.  If test is done without insurance authorization, patient may be responsible for the entire amount billed.      Precertification and Prior Authorizations:  If your physician has recommended that you have a procedure or additional testing performed the [facility] Centralized Referral Team will contact your insurance carrier to obtain pre-certification or prior authorization.    You are strongly encouraged to contact your insurance carrier to verify that your procedure/test has been approved and is a COVERED benefit.  Although the [facility] Centralized Referral Team does its due diligence, the insurance carrier gives the disclaimer that \"Although the procedure is authorized, this does not guarantee payment.\"    Ultimately the patient is responsible for payment.   Thank you for your understanding in this matter.  Paperwork Completion:  If you require FMLA or disability paperwork for your recovery, please make sure to either drop it off or have it faxed to our office at [phone]. Be sure the form has your name and date of birth on it.  The form will be faxed to our Forms Department and they will complete it for you.  There is a 25$ fee for all forms that need to be filled out.  Please be aware there is a 10-14 day turnaround time.  You will need to sign a release of information (DAVE) form if your paperwork does not come with one.  You may call the Forms Department with any questions at [phone].  Their fax number is [phone].        EEG INSTRUCTIONS:    TESTING:    You will have 23 electrodes placed on your head for the EEG and 2 on your chest for an EKG tracing.  Head will be measured using a washable grease pencil.  Head will be prepped with a mild abrasive for good conductivity.  Electrodes are applied with paste and gauze.  Paste is water  soluble so most of it will be cleaned out of your hair with a warm washcloth before you leave.  If you like, you can bring a hat to wear after the test or put your hair in a ponytail.   You will be asked to relax with your eyes closed for a majority of the test and take a nap, if possible.  After the test,  the leads will be removed and your head will be cleaned by technologist.  The test is about 45-60 minutes long, including set-up and clean-up.    PREPARATION:    You need to be sleep deprived and should only sleep 4 hours the night before; midnight to 4:00 am is recommended.   Continue to take all medications as prescribed.  You should arrive with clean, DRY hair that is free of oils, gels and spray.  You can eat before the test but avoid caffeine (coffee, tea, chocolate) for at least 4  hours prior to the test.    Please call Central Scheduling at [phone] to schedule your test.  Location: [facility]    [address] [facility]  ([facility]) [address] [place] [address]  [id number]

## 2024-01-24 NOTE — PROGRESS NOTES
[facility]   Neurology    Riaz Davies Patient Status:  No patient class for patient encounter    2/15/1995 MRN [id number]   Location [facility], [facility], [place] PCP Tierra Justice DO              HPI:   Riaz Davies is a(n) 28 year old male with history of paraplegia from a T6 spinal trauma, suicide attempt, neuropathic pain, seizures tethered spinal cord, s/p surgery for tethered cord and myelotomy for neuropathic pain from spinal cord injury, surgery in  at [facility], on chronic morphin, bupivicaine, and baclofen pump, who presents at the request of Tierra Justice DO for evaluation of recurrent seizures. In  he had a DVT, for which he is on anticoagulation eliquis for. In  and  he started having episodes of decreased responsiveness that would last days. Work up revealed spike and wave activity during an episode of altered awareness and altered behavior (would keep saying he is tired and would not get dressed). He would be told to use shower gel to wash himself, but he would pour the whole bottle out. He would have no recollection of most of the events. He was started on Keppra, but had a hard time taking it twice a day. He eventually stopped it. Did not follow up with neurology. In  he was admitted to [facility] psychiatric unit for episodes of flailing arms. Patient reports every 4-6 months he had episodes of decreased alertness. However, mother reports it is different from the confusional episodes a few years ago. He will be rocking back in fourth in bed, and when parents come in room and ask him questions, he gets angry. Mom reports he is still able to perform hygiene with these episodes. Patient reports he will have memory loss for the majority of the events from the prior 3 days. Patient reports missed new years farooq a month ago , woke on on Dick 3 and recalling that he had missed the first few days of the year. Patient  reports stopped Keppra because of the nerve pain, was depressed, and did not want to take medications or see any doctors.     Pertinent imaging and laboratory work-up:   4/2019 EEG  IMPRESSION:  Abnormal EEG due to continous 3 Hz spike and waves suggestive of electrographic non convulsive status epilepticus. A follow up study is needed. Clinical correlation is advised.     EEG 2018  IMPRESSION:  Abnormal EEG due to continous 3 Hz spike and waves suggestive of electrographic non convulsive status epilepticus. A follow up study is needed. Clinical correlation is advised.     Past Medical History:  Past Medical History:   Diagnosis Date    Altered mental status, unspecified altered mental status type 04/11/2019    Anal pain 10/28/2016    Asthma     Attention deficit hyperactivity disorder (ADHD), predominantly inattentive type 07/01/2017    Coagulation defect (HCC)     Apixaban 5 mg twice a day.    Coccygodynia 05/25/2017    Deliberate self-cutting     Ecthyma 02/06/2021    Episode of abnormal behavior     Fracture of thoracic vertebra, T1-T6 with complete cord lesion 04/09/2012    Heartburn 06/29/2012    History of spinal cord injury 09/19/2012    Major depressive disorder, single episode in full remission (HCC) 09/05/2012    Moderate persistent asthma without complication 08/08/2019    Mood disorder (HCC) 08/24/2019    Neurogenic bladder 05/14/2013    Paralysis of both lower limbs ([facility]) 09/19/2012    Presence of intrathecal pump 10/05/2022    Presence of IVC filter 10/05/2022    Prolapsed internal hemorrhoids, grade 3 10/28/2016    Right axis deviation     9/30/2022 ECG    Seizure disorder, status epilepticus, nonconvulsive (HCC)     Severe recurrent major depression without psychotic features ([facility]) 03/17/2022    Reconciled from care everywhere.    Suicide and self-inflicted poisoning by unspecified drug or medicinal substance(E950.5) 04/2011    Unspecified drug dependence, unspecified 04/2011    at [facility]     Unspecified site of spinal cord injury without evidence of spinal bone injury     spinal compression t4-t6    Varicella 10/04/2010        Past Surgical History:  Past Surgical History:   Procedure Laterality Date    BONE GRAFT FEMUR HEAD/NECK/RIDGE  03/2022    CATH INFERIOR VENA CAVA FILTER INSERTION  2012    LAMINECTOMY,>2 SGMT,LUMBAR  1/20/2012    T6 paraplegic    OTHER SURGICAL HISTORY      fusion/refusion vertebrae    OTHER SURGICAL HISTORY  12/2015    intrathecal pain pump       Family History:  family history includes Allergies in his sister; Asthma in his sister; HTN in his father and paternal grandfather; Thyroid Disorder in his maternal grandfather; colon cancer in his maternal grandfather; diabetes mellitus in his paternal grandmother; hyperlipidemia in his father; stroke syndrome in his paternal grandfather.      Social History:   reports that he quit smoking about 9 years ago. His smoking use included cigarettes. He has never used smokeless tobacco. He reports current alcohol use of about 1.0 standard drink of alcohol per week. He reports current drug use. Frequency: 7.00 times per week. Drug: Cannabis.    Allergies:  Allergies   Allergen Reactions    Seasonal Runny nose       MEDICATIONS:    Current Outpatient Medications:     Solifenacin Succinate 10 MG Oral Tab, Take 1 tablet (10 mg total) by mouth daily., Disp: , Rfl:     nicotine 7 MG/24HR Transdermal Patch 24 Hr, Place 1 patch onto the skin daily., Disp: 30 patch, Rfl: 0    albuterol 108 (90 Base) MCG/ACT Inhalation Aero Soln, Inhale 2 puffs into the lungs every 6 (six) hours as needed for Wheezing or Shortness of Breath., Disp: 8.5 each, Rfl: 0    Amphetamine-Dextroamphet ER (ADDERALL XR) 25 MG Oral Capsule SR 24 Hr, Take 1 capsule (25 mg total) by mouth every morning., Disp: , Rfl:     Bupivacaine HCl 0.5 % Injection Solution, Intrathecal., Disp: , Rfl:     acetaminophen 325 MG Oral Tab, Take 2 tablets (650 mg total) by mouth as needed., Disp: ,  Rfl:     apixaban 5 MG Oral Tab, Take 1 tablet (5 mg total) by mouth 2 (two) times daily., Disp: , Rfl:     famotidine 20 MG Oral Tab, Take 1 tablet (20 mg total) by mouth as needed., Disp: , Rfl:     pregabalin 300 MG Oral Cap, Take 1 capsule (300 mg total) by mouth daily., Disp: , Rfl:     ARIPIPRAZOLE 2 MG Oral Tab, TAKE 1 TABLET BY MOUTH EVERY DAY, Disp: 30 tablet, Rfl: 0    DULOXETINE HCL 60 MG Oral Cap DR Particles, TAKE 1 CAPSULE (60 MG TOTAL) BY MOUTH ONCE DAILY. WITH 20MG = 80MG DAILY (Patient taking differently: 2 capsules (120 mg total) daily.), Disp: 90 capsule, Rfl: 0    NON FORMULARY, Suppository for Hemorrhoids as needed. OTC , Disp: , Rfl:     baclofen 2 mcg/mL in sodium chloride 0.9 % for pain pump, by Intrathecal route daily. Dosage unknown , Disp: , Rfl:     morphINE Sulfate (PF) in Sodium Chloride IT infusion, 0.85 mg by Intrathecal route daily.  , Disp: , Rfl:     lidocaine (LIDODERM) 5 % Apply Externally Patch, Place 2 patches onto the skin daily. As needed, Disp: , Rfl: 0    Bisacodyl 10 MG Rectal Suppos, Place 1 suppository (10 mg total) rectally as needed., Disp: , Rfl:     divalproex  MG Oral Tablet 24 Hr, Week 1: take 1 tablet twice a day, Week 2: take 2 tablets twice a day (Patient not taking: Reported on 1/24/2024), Disp: 120 tablet, Rfl: 0      Review of Systems:   A comprehensive 10 point review of systems was completed.     Pertinent positives and negatives noted in the HPI.         PHYSICAL EXAM:   Neurologic Exam  Vitals  Vitals:    01/24/24 1456   BP: 122/78   Pulse: 94   Resp: 16     General Appearance: Patient is a 28 year old male in no acute distress  Cardiac: Normal rate & regular rhythm  Skin: There are no rashes or other skin lesions.  Musculoskeletal: There is no scoliosis, or joint deformities  Neurologic examination:  Mental status: Patient is alert, attentive, and oriented x 3. Language is coherent and fluent without aphasia. Memory, comprehension and ability to  follow commands were intact.   Cranial nerves II-XII: Optic discs were sharp. Pupils were round and reacted to light. Extraocular movements were full. There was no face, jaw, palate or tongue weakness or atrophy. Facial sensation was normal. Hearing was grossly intact. Shoulder shrug was normal.   Motor exam revealed normal muscle bulk and tone. No atrophy or fasciculations. Manual muscle testing revealed MRC grade 5/5 strength throughout including proximal and distal muscles of the arms and legs.  Deep tendon reflexes were 2 at the biceps, brachioradialis, triceps, knee jerk, and ankle jerk. Plantar responses were flexor bilaterally.   Sensory exam revealed normal light touch perception. Vibratory perception and proprioception were intact at the toes. Pinprick and temperature were normal. Romberg sign was absent.  Complex motor skills revealed normal coordination. Finger-nose-finger intact.   Gait was narrow and stable, was able to walk on heels, toes and tandem without any difficulty.     ASSESSMENT/ACTIVE PROBLEM LIST:     Encounter Diagnoses   Name Primary?    Symptomatic localization-related epilepsy (HCC) Yes    Spinal cord injury at T1-T6 level with complete spinal cord lesion, sequela (HCC)     Episode of abnormal behavior      Discussion/Plan:  Seizures- atypical prolonged episodes with psychomotor slowing, decreased interaction and responsiveness, with EEG correlation, patient came off Keppra due to not wanting medical care when was very depressed, patient has history of suicide attempt  Due to higher risk of noncompliance and mood side effects, recommend initiating Depakote ER 250mg BID then 500mg BID  Obtain EEG  Depakote level in 3 weeks   Need to make sure taking eliquis twice a day  If not able to take evening dose consistently, Depakote, please let me know  Insomnia- consider discussing elavil with psychiatrist, however patient on duloxetine 120mg daily  Neuropathic pain from spinal cord injury- on  baclofen pump, history of multiple neuropathic agents not working and combination cream gabapentin/lidocain/meloxicam cream did not improve pain, improved significantly after myelotomy at [facility], on Lyrica 300mg and Cymbalta    Requested Prescriptions     Signed Prescriptions Disp Refills    divalproex  MG Oral Tablet 24 Hr 120 tablet 0     Sig: Week 1: take 1 tablet twice a day, Week 2: take 2 tablets twice a day     Patient not taking: Reported on 1/24/2024          We discussed in depth regarding the diagnosis, prognosis, treatment. The patient was given ample opportunity to ask questions. All questions and concerns were addressed.     Cassandra Valdivia,   Neuromuscular and General Neurology  [facility]

## 2024-01-25 ENCOUNTER — APPOINTMENT (OUTPATIENT)
Dept: URBAN - METROPOLITAN AREA CLINIC 315 | Age: 29
Setting detail: DERMATOLOGY
End: 2024-01-26

## 2024-01-25 DIAGNOSIS — L20.89 OTHER ATOPIC DERMATITIS: ICD-10-CM

## 2024-01-25 PROCEDURE — 99204 OFFICE O/P NEW MOD 45 MIN: CPT

## 2024-01-25 PROCEDURE — OTHER PRESCRIPTION MEDICATION MANAGEMENT: OTHER

## 2024-01-25 PROCEDURE — OTHER PRESCRIPTION: OTHER

## 2024-01-25 PROCEDURE — OTHER COUNSELING: OTHER

## 2024-01-25 PROCEDURE — OTHER MIPS QUALITY: OTHER

## 2024-01-25 RX ORDER — PIMECROLIMUS 10 MG/G
CREAM TOPICAL
Qty: 60 | Refills: 2 | Status: ERX | COMMUNITY
Start: 2024-01-25

## 2024-01-25 RX ORDER — HYDROCORTISONE 25 MG/G
OINTMENT TOPICAL
Qty: 453.6 | Refills: 0 | Status: ERX | COMMUNITY
Start: 2024-01-25

## 2024-01-25 RX ORDER — HYDROCORTISONE 25 MG/G
CREAM TOPICAL
Qty: 30 | Refills: 4 | Status: ERX | COMMUNITY
Start: 2024-01-25

## 2024-01-25 RX ORDER — TRIAMCINOLONE ACETONIDE 1 MG/G
CREAM TOPICAL
Qty: 453.6 | Refills: 3 | Status: ERX | COMMUNITY
Start: 2024-01-25

## 2024-01-25 ASSESSMENT — LOCATION SIMPLE DESCRIPTION DERM
LOCATION SIMPLE: POSTERIOR NECK
LOCATION SIMPLE: RIGHT UPPER BACK
LOCATION SIMPLE: RIGHT ANTERIOR NECK
LOCATION SIMPLE: LEFT CHEEK

## 2024-01-25 ASSESSMENT — LOCATION ZONE DERM
LOCATION ZONE: NECK
LOCATION ZONE: TRUNK
LOCATION ZONE: FACE

## 2024-01-25 ASSESSMENT — LOCATION DETAILED DESCRIPTION DERM
LOCATION DETAILED: LEFT CENTRAL MALAR CHEEK
LOCATION DETAILED: RIGHT CLAVICULAR NECK
LOCATION DETAILED: RIGHT SUPERIOR MEDIAL UPPER BACK
LOCATION DETAILED: MID POSTERIOR NECK

## 2024-01-25 NOTE — PROCEDURE: MIPS QUALITY
Quality 134: Screening For Clinical Depression And Follow-Up Plan: The patient was screened for depression and the screen was negative and no follow up required
Quality 226: Preventive Care And Screening: Tobacco Use: Screening And Cessation Intervention: Patient screened for tobacco use and is an ex/non-smoker
Quality 137: Melanoma: Continuity Of Care - Recall System: Recall system not utilized, reason not otherwise specified
Detail Level: Detailed
Quality 138: Melanoma: Coordination Of Care: Treatment plan not communicated, reason not otherwise specified.
Quality 130: Documentation Of Current Medications In The Medical Record: Current Medications Documented
Quality 110: Preventive Care And Screening: Influenza Immunization: Influenza Immunization previously received during influenza season
Quality 47: Advance Care Plan: Advance care planning not documented, reason not otherwise specified.
Quality 431: Preventive Care And Screening: Unhealthy Alcohol Use - Screening: Patient not identified as an unhealthy alcohol user when screened for unhealthy alcohol use using a systematic screening method

## 2024-01-25 NOTE — PROCEDURE: PRESCRIPTION MEDICATION MANAGEMENT
Initiate Treatment: pimecrolimus 1 % topical cream \\nApply 1-2 times per day to prevent flair ups.\\n\\ntriamcinolone acetonide 0.1 % topical cream \\nApply to affected area twice daily x one week and once a day x one week with one weak break. Repeat as needed for flares.\\n\\nhydrocortisone 2.5 % topical ointment \\nApply to affected area twice a day for two weeks, then break for 1 week. PRN with flares. (Face)
Render In Strict Bullet Format?: No
Detail Level: Zone

## 2024-01-26 DIAGNOSIS — N31.9 BLADDER DYSFUNCTION: Primary | ICD-10-CM

## 2024-01-28 DIAGNOSIS — Z79.899 ENCOUNTER FOR LONG-TERM CURRENT USE OF MEDICATION: ICD-10-CM

## 2024-01-28 DIAGNOSIS — J45.40 MODERATE PERSISTENT ASTHMA WITHOUT COMPLICATION: ICD-10-CM

## 2024-01-29 RX ORDER — FLUTICASONE FUROATE AND VILANTEROL 200; 25 UG/1; UG/1
1 POWDER RESPIRATORY (INHALATION) DAILY
Qty: 180 EACH | Refills: 0 | OUTPATIENT
Start: 2024-01-29

## 2024-01-30 DIAGNOSIS — Z79.899 ENCOUNTER FOR LONG-TERM CURRENT USE OF MEDICATION: ICD-10-CM

## 2024-01-30 DIAGNOSIS — J45.40 MODERATE PERSISTENT ASTHMA WITHOUT COMPLICATION: ICD-10-CM

## 2024-01-30 RX ORDER — ALBUTEROL SULFATE 90 UG/1
2 AEROSOL, METERED RESPIRATORY (INHALATION) EVERY 6 HOURS PRN
Qty: 8.5 EACH | Refills: 0 | Status: SHIPPED | OUTPATIENT
Start: 2024-01-30

## 2024-01-30 NOTE — TELEPHONE ENCOUNTER
Requested Prescriptions     Signed Prescriptions Disp Refills    ALBUTEROL 108 (90 Base) MCG/ACT Inhalation Aero Soln 8.5 each 0     Sig: TAKE 2 PUFFS BY MOUTH EVERY 6 HOURS AS NEEDED FOR WHEEZE OR SHORTNESS OF BREATH     Authorizing Provider: JESSIE WELLS     Ordering User: JOHN FLORES     Met protocol. Refill sent.

## 2024-02-01 RX ORDER — DULOXETIN HYDROCHLORIDE 60 MG/1
60 CAPSULE, DELAYED RELEASE ORAL DAILY
Qty: 90 CAPSULE | Refills: 1 | Status: SHIPPED | OUTPATIENT
Start: 2024-02-01

## 2024-02-05 ENCOUNTER — APPOINTMENT (OUTPATIENT)
Dept: ULTRASOUND IMAGING | Age: 29
End: 2024-02-05
Attending: UROLOGY

## 2024-02-05 ENCOUNTER — MED REC SCAN ONLY (OUTPATIENT)
Dept: FAMILY MEDICINE CLINIC | Facility: CLINIC | Age: 29
End: 2024-02-05

## 2024-02-05 ENCOUNTER — LAB SERVICES (OUTPATIENT)
Dept: LAB | Age: 29
End: 2024-02-05

## 2024-02-05 DIAGNOSIS — N31.9 BLADDER DYSFUNCTION: ICD-10-CM

## 2024-02-05 DIAGNOSIS — N31.9 NEUROMUSCULAR DYSFUNCTION OF BLADDER, UNSPECIFIED: Primary | ICD-10-CM

## 2024-02-05 LAB
ANION GAP SERPL CALC-SCNC: 7 MMOL/L (ref 7–19)
APPEARANCE UR: CLEAR
BILIRUB UR QL STRIP: NEGATIVE
BUN SERPL-MCNC: 13 MG/DL (ref 6–20)
BUN/CREAT SERPL: 26 (ref 7–25)
CALCIUM SERPL-MCNC: 9.4 MG/DL (ref 8.4–10.2)
CHLORIDE SERPL-SCNC: 104 MMOL/L (ref 97–110)
CO2 SERPL-SCNC: 32 MMOL/L (ref 21–32)
COLOR UR: NORMAL
CREAT SERPL-MCNC: 0.5 MG/DL (ref 0.67–1.17)
EGFRCR SERPLBLD CKD-EPI 2021: >90 ML/MIN/{1.73_M2}
FASTING DURATION TIME PATIENT: 12 HOURS (ref 0–999)
GLUCOSE SERPL-MCNC: 81 MG/DL (ref 70–99)
GLUCOSE UR STRIP-MCNC: NEGATIVE MG/DL
HGB UR QL STRIP: NEGATIVE
KETONES UR STRIP-MCNC: NEGATIVE MG/DL
LEUKOCYTE ESTERASE UR QL STRIP: NEGATIVE
NITRITE UR QL STRIP: NEGATIVE
PH UR STRIP: 6 [PH] (ref 5–7)
POTASSIUM SERPL-SCNC: 4.5 MMOL/L (ref 3.4–5.1)
PROT UR STRIP-MCNC: NEGATIVE MG/DL
SODIUM SERPL-SCNC: 138 MMOL/L (ref 135–145)
SP GR UR STRIP: 1.02 (ref 1–1.03)
UROBILINOGEN UR STRIP-MCNC: 0.2 MG/DL

## 2024-02-05 PROCEDURE — 80048 BASIC METABOLIC PNL TOTAL CA: CPT | Performed by: CLINICAL MEDICAL LABORATORY

## 2024-02-05 PROCEDURE — 76775 US EXAM ABDO BACK WALL LIM: CPT | Performed by: STUDENT IN AN ORGANIZED HEALTH CARE EDUCATION/TRAINING PROGRAM

## 2024-02-05 PROCEDURE — 36415 COLL VENOUS BLD VENIPUNCTURE: CPT | Performed by: CLINICAL MEDICAL LABORATORY

## 2024-02-06 ENCOUNTER — E-ADVICE (OUTPATIENT)
Dept: PHYSICAL MEDICINE AND REHAB | Age: 29
End: 2024-02-06

## 2024-02-06 ENCOUNTER — NURSE ONLY (OUTPATIENT)
Dept: ELECTROPHYSIOLOGY | Facility: HOSPITAL | Age: 29
End: 2024-02-06
Attending: Other
Payer: MEDICARE

## 2024-02-06 DIAGNOSIS — G40.109 SYMPTOMATIC LOCALIZATION-RELATED EPILEPSY (HCC): ICD-10-CM

## 2024-02-06 PROCEDURE — 95819 EEG AWAKE AND ASLEEP: CPT

## 2024-02-13 ENCOUNTER — TELEPHONE (OUTPATIENT)
Dept: NEUROLOGY | Facility: CLINIC | Age: 29
End: 2024-02-13

## 2024-02-13 NOTE — TELEPHONE ENCOUNTER
Patient advised regarding EEG. He states he started the medication before the EEG. Patient advised to get lab work done. Patient verbalized understanding.

## 2024-02-13 NOTE — TELEPHONE ENCOUNTER
Please inform patient EEG normal. Please confirm whether patient started Depakote prior to or after completing EEG on 2/6/24. Patient should get Depakote level drawn after at least being on it for one week, and if not tolerating it, to let us know.

## 2024-02-15 ENCOUNTER — TELEPHONE (OUTPATIENT)
Dept: FAMILY MEDICINE CLINIC | Facility: CLINIC | Age: 29
End: 2024-02-15

## 2024-02-15 ENCOUNTER — MED REC SCAN ONLY (OUTPATIENT)
Dept: FAMILY MEDICINE CLINIC | Facility: CLINIC | Age: 29
End: 2024-02-15

## 2024-02-15 NOTE — TELEPHONE ENCOUNTER
Received fax on 2/15/2024 requesting medical records. Requesting medical records for progress notes within a timeframe 2/3/2023 - 2/2/2024.     Name: [facility]    Address: [address] [place] [address]  PH: [phone]  Fax: [phone]    Original sent to Scan [facility], copy sent to medical records. Red Tag # [id number]

## 2024-02-15 NOTE — TELEPHONE ENCOUNTER
[facility] calling requesting clinical notes prior to 12/20/2023 for patient, regarding patient supplies.   [facility] will be faxing over a request to fax along with clinical notes.

## 2024-02-24 DIAGNOSIS — Z79.899 ENCOUNTER FOR LONG-TERM CURRENT USE OF MEDICATION: ICD-10-CM

## 2024-02-24 DIAGNOSIS — J45.40 MODERATE PERSISTENT ASTHMA WITHOUT COMPLICATION (HCC): ICD-10-CM

## 2024-02-26 ENCOUNTER — APPOINTMENT (OUTPATIENT)
Dept: PHYSICAL MEDICINE AND REHAB | Age: 29
End: 2024-02-26

## 2024-02-26 VITALS
HEART RATE: 98 BPM | RESPIRATION RATE: 18 BRPM | TEMPERATURE: 97.3 F | SYSTOLIC BLOOD PRESSURE: 121 MMHG | DIASTOLIC BLOOD PRESSURE: 77 MMHG

## 2024-02-26 DIAGNOSIS — G56.23 ULNAR NEUROPATHY OF BOTH UPPER EXTREMITIES: ICD-10-CM

## 2024-02-26 DIAGNOSIS — M62.838 MUSCLE SPASTICITY: ICD-10-CM

## 2024-02-26 DIAGNOSIS — R20.0 NUMBNESS AND TINGLING IN BOTH HANDS: Primary | ICD-10-CM

## 2024-02-26 DIAGNOSIS — G82.20 PARAPLEGIA, UNSPECIFIED (CMD): ICD-10-CM

## 2024-02-26 DIAGNOSIS — R20.2 NUMBNESS AND TINGLING IN BOTH HANDS: Primary | ICD-10-CM

## 2024-02-26 RX ORDER — ALBUTEROL SULFATE 90 UG/1
2 AEROSOL, METERED RESPIRATORY (INHALATION) EVERY 6 HOURS PRN
Qty: 8.5 EACH | Refills: 0 | Status: SHIPPED | OUTPATIENT
Start: 2024-02-26

## 2024-02-26 RX ORDER — PREGABALIN 300 MG/1
300 CAPSULE ORAL DAILY
Qty: 180 CAPSULE | Refills: 1 | Status: SHIPPED | OUTPATIENT
Start: 2024-02-26

## 2024-02-26 ASSESSMENT — PAIN SCALES - GENERAL: PAINLEVEL: 1

## 2024-02-26 NOTE — TELEPHONE ENCOUNTER
Attempted to contact the patient to verify how many tablets he is currently taking. Unable to leave a detailed message due to no patient identifiers.         Medication:  DIVALPROEX  MG Oral Tablet 24 Hr      Date of last refill: 01/24/2024 (#120/0)  [place]     Last office visit: 01/24/2024  Due back to clinic per last office note:  Around 05/24/2024  Date next office visit scheduled:    Future Appointments   Date Time Provider Department Center   7/3/2024 11:15 AM Marilee Valdivia DO [facility] [place]           Last OV note recommendation:    Discussion/Plan:  Seizures- atypical prolonged episodes with psychomotor slowing, decreased interaction and responsiveness, with EEG correlation, patient came off Keppra due to not wanting medical care when was very depressed, patient has history of suicide attempt  Due to higher risk of noncompliance and mood side effects, recommend initiating Depakote ER 250mg BID then 500mg BID  Obtain EEG  Depakote level in 3 weeks   Need to make sure taking eliquis twice a day  If not able to take evening dose consistently, Depakote, please let me know  Insomnia- consider discussing elavil with psychiatrist, however patient on duloxetine 120mg daily  Neuropathic pain from spinal cord injury- on baclofen pump, history of multiple neuropathic agents not working and combination cream gabapentin/lidocain/meloxicam cream did not improve pain, improved significantly after myelotomy at [facility], on Lyrica 300mg and Cymbalta

## 2024-03-20 DIAGNOSIS — Z79.899 ENCOUNTER FOR LONG-TERM CURRENT USE OF MEDICATION: ICD-10-CM

## 2024-03-20 DIAGNOSIS — J45.40 MODERATE PERSISTENT ASTHMA WITHOUT COMPLICATION (HCC): ICD-10-CM

## 2024-03-20 RX ORDER — ALBUTEROL SULFATE 90 UG/1
2 AEROSOL, METERED RESPIRATORY (INHALATION) EVERY 6 HOURS PRN
Qty: 8.5 EACH | Refills: 0 | Status: SHIPPED | OUTPATIENT
Start: 2024-03-20

## 2024-03-20 NOTE — TELEPHONE ENCOUNTER
Requested Prescriptions     Signed Prescriptions Disp Refills    ALBUTEROL 108 (90 Base) MCG/ACT Inhalation Aero Soln 8.5 each 0     Sig: TAKE 2 PUFFS BY MOUTH EVERY 6 HOURS AS NEEDED FOR WHEEZE OR SHORTNESS OF BREATH     Authorizing Provider: JESSIE WELLS     Ordering User: SANDEEP ZARAGOZA     Refilled per protocol/OV notes

## 2024-04-03 ENCOUNTER — TELEPHONE (OUTPATIENT)
Dept: FAMILY MEDICINE CLINIC | Facility: CLINIC | Age: 29
End: 2024-04-03

## 2024-04-03 NOTE — TELEPHONE ENCOUNTER
[facility] requested OV notes between 12/20/22-12/20-23 related to catheter supply.    OV notes x 2 during this timeframe faxed to

## 2024-04-08 NOTE — TELEPHONE ENCOUNTER
Adriana from [facility], requesting OV notes to be faxed again, states they never received notes regarding catheter supply for patient    OV notes from 12/20/2022 -12/20/2023    Fax [phone]

## 2024-04-16 RX ORDER — DIVALPROEX SODIUM 250 MG/1
TABLET, EXTENDED RELEASE ORAL
Qty: 120 TABLET | Refills: 0 | Status: SHIPPED
Start: 2024-04-16

## 2024-04-16 NOTE — TELEPHONE ENCOUNTER
Received message that electronic transmission of Rx for divalproex failed.  Contacted pharmacy and provided verbal order with readback confirmation to pharmacist.

## 2024-04-22 ENCOUNTER — MED REC SCAN ONLY (OUTPATIENT)
Dept: FAMILY MEDICINE CLINIC | Facility: CLINIC | Age: 29
End: 2024-04-22

## 2024-04-22 ENCOUNTER — TELEPHONE (OUTPATIENT)
Dept: FAMILY MEDICINE CLINIC | Facility: CLINIC | Age: 29
End: 2024-04-22

## 2024-04-22 DIAGNOSIS — J45.40 MODERATE PERSISTENT ASTHMA WITHOUT COMPLICATION (HCC): ICD-10-CM

## 2024-04-22 DIAGNOSIS — Z79.899 ENCOUNTER FOR LONG-TERM CURRENT USE OF MEDICATION: ICD-10-CM

## 2024-04-22 RX ORDER — ALBUTEROL SULFATE 90 UG/1
2 AEROSOL, METERED RESPIRATORY (INHALATION) EVERY 6 HOURS PRN
Qty: 8.5 EACH | Refills: 0 | Status: SHIPPED | OUTPATIENT
Start: 2024-04-22

## 2024-04-22 NOTE — TELEPHONE ENCOUNTER
Received fax on 4/19/2024 requesting medical records. Requesting medical records for 12/20/2022 - 12/20/2023, please provide progress notes within a timeframe prior to 12/20/2022 to 12/20/2023.     Name: [facility]   Address: [address] [place] [id number]  PH: [phone]  Fax: [phone]    Original sent to Scan [facility], copy sent to medical records. Red Tag # [id number]

## 2024-05-08 ENCOUNTER — EXTERNAL RECORD (OUTPATIENT)
Dept: HEALTH INFORMATION MANAGEMENT | Facility: OTHER | Age: 29
End: 2024-05-08

## 2024-05-16 ENCOUNTER — E-ADVICE (OUTPATIENT)
Dept: OTHER | Age: 29
End: 2024-05-16

## 2024-05-16 NOTE — TELEPHONE ENCOUNTER
Medication: Divalproex 250 mg      Date of last refill: 04/16/2024 (#120/0)  [place]     Last office visit: 01/24/2024  Due back to clinic per last office note:  4 months  Date next office visit scheduled:    Future Appointments   Date Time Provider Department Center   7/3/2024 11:15 AM Marilee Valdivia DO [facility] [place]           Last OV note recommendation:    Discussion/Plan:  Seizures- atypical prolonged episodes with psychomotor slowing, decreased interaction and responsiveness, with EEG correlation, patient came off Keppra due to not wanting medical care when was very depressed, patient has history of suicide attempt  Due to higher risk of noncompliance and mood side effects, recommend initiating Depakote ER 250mg BID then 500mg BID  Obtain EEG  Depakote level in 3 weeks   Need to make sure taking eliquis twice a day  If not able to take evening dose consistently, Depakote, please let me know  Insomnia- consider discussing elavil with psychiatrist, however patient on duloxetine 120mg daily  Neuropathic pain from spinal cord injury- on baclofen pump, history of multiple neuropathic agents not working and combination cream gabapentin/lidocain/meloxicam cream did not improve pain, improved significantly after myelotomy at [facility], on Lyrica 300mg and Cymbalta

## 2024-05-22 ENCOUNTER — APPOINTMENT (OUTPATIENT)
Dept: PHYSICAL MEDICINE AND REHAB | Age: 29
End: 2024-05-22

## 2024-05-22 VITALS
SYSTOLIC BLOOD PRESSURE: 121 MMHG | DIASTOLIC BLOOD PRESSURE: 79 MMHG | TEMPERATURE: 98.3 F | RESPIRATION RATE: 18 BRPM | HEART RATE: 77 BPM

## 2024-05-22 DIAGNOSIS — M62.838 OTHER MUSCLE SPASM: ICD-10-CM

## 2024-05-22 DIAGNOSIS — G82.20 PARAPLEGIA, UNSPECIFIED  (CMD): ICD-10-CM

## 2024-05-22 DIAGNOSIS — S31.109A WOUND OF RIGHT GROIN, INITIAL ENCOUNTER: ICD-10-CM

## 2024-05-22 DIAGNOSIS — M62.838 MUSCLE SPASTICITY: Primary | ICD-10-CM

## 2024-05-22 ASSESSMENT — PAIN SCALES - GENERAL: PAINLEVEL: 0

## 2024-07-03 ENCOUNTER — OFFICE VISIT (OUTPATIENT)
Dept: NEUROLOGY | Facility: CLINIC | Age: 29
End: 2024-07-03
Payer: MEDICARE

## 2024-07-03 VITALS — HEART RATE: 80 BPM | DIASTOLIC BLOOD PRESSURE: 68 MMHG | SYSTOLIC BLOOD PRESSURE: 118 MMHG | RESPIRATION RATE: 16 BRPM

## 2024-07-03 DIAGNOSIS — F32.0 CURRENT MILD EPISODE OF MAJOR DEPRESSIVE DISORDER, UNSPECIFIED WHETHER RECURRENT (HCC): ICD-10-CM

## 2024-07-03 DIAGNOSIS — S24.111S SPINAL CORD INJURY AT T1-T6 LEVEL WITH COMPLETE SPINAL CORD LESION, SEQUELA (HCC): ICD-10-CM

## 2024-07-03 DIAGNOSIS — M79.2 NEUROPATHIC PAIN: ICD-10-CM

## 2024-07-03 DIAGNOSIS — G40.209 EPILEPSY WITH PARTIAL COMPLEX SEIZURES (HCC): Primary | ICD-10-CM

## 2024-07-03 PROCEDURE — 99213 OFFICE O/P EST LOW 20 MIN: CPT | Performed by: OTHER

## 2024-07-03 RX ORDER — DIVALPROEX SODIUM 500 MG/1
500 TABLET, DELAYED RELEASE ORAL 2 TIMES DAILY
Qty: 180 TABLET | Refills: 3 | Status: SHIPPED | OUTPATIENT
Start: 2024-07-03

## 2024-07-03 NOTE — PROGRESS NOTES
[facility] - [place]   Neurology    Riaz Davies Patient Status:  No patient class for patient encounter    2/15/1995 MRN [id number]   Location [facility], [facility], [place] PCP Tierra Justice DO              HPI:   Riaz Davies is a(n) 29 year old male with history of paraplegia from a T6 spinal trauma, suicide attempt, neuropathic pain, seizures tethered spinal cord, s/p surgery for tethered cord and myelotomy for neuropathic pain from spinal cord injury, surgery in  at [facility], on chronic morphin, bupivicaine, and baclofen pump, who presents at the request of Tierra Justice DO for evaluation of recurrent seizures. In  he had a DVT, for which he is on anticoagulation eliquis for. In  and  he started having episodes of decreased responsiveness that would last days. Work up revealed spike and wave activity during an episode of altered awareness and altered behavior (would keep saying he is tired and would not get dressed). He would be told to use shower gel to wash himself, but he would pour the whole bottle out. He would have no recollection of most of the events. He was started on Keppra, but had a hard time taking it twice a day. He eventually stopped it. Did not follow up with neurology. In  he was admitted to [facility] psychiatric unit for episodes of flailing arms. Patient reports every 4-6 months he had episodes of decreased alertness. However, mother reports it is different from the confusional episodes a few years ago. He will be rocking back in fourth in bed, and when parents come in room and ask him questions, he gets angry. Mom reports he is still able to perform hygiene with these episodes. Patient reports he will have memory loss for the majority of the events from the prior 3 days. Patient reports missed new years farooq a month ago , woke on on Dick 3 and recalling that he had missed the first few days of the year. Patient  reports stopped Keppra because of the nerve pain, was depressed, and did not want to take medications or see any doctors.   Interim  Has not had any seizures since LOV. Tolerating Depakote. Reports in the past mood did not worsen on Keppra.       Pertinent imaging and laboratory work-up:   2/2024- Cr 0.5, Na 138  2/2024 EEG  IMPRESSION:  This is a normal, awake and asleep electroencephalogram.  There is no clear-cut focal abnormality or epileptiform activity seen in this record. There was minimal sleep obtained during the recording.      4/2019 EEG  IMPRESSION:  Abnormal EEG due to continous 3 Hz spike and waves suggestive of electrographic non convulsive status epilepticus. A follow up study is needed. Clinical correlation is advised.     EEG 2018  IMPRESSION:  Abnormal EEG due to continous 3 Hz spike and waves suggestive of electrographic non convulsive status epilepticus. A follow up study is needed. Clinical correlation is advised.     Past Medical History:  Past Medical History:    Altered mental status, unspecified altered mental status type    Anal pain    Asthma (HCC)    Attention deficit hyperactivity disorder (ADHD), predominantly inattentive type    Coagulation defect (HCC)    Apixaban 5 mg twice a day.    Coccygodynia    Deliberate self-cutting    Ecthyma    Episode of abnormal behavior    Fracture of thoracic vertebra, T1-T6 with complete cord lesion    Heartburn    History of spinal cord injury    Major depressive disorder, single episode in full remission (HCC)    Moderate persistent asthma without complication (HCC)    Mood disorder (HCC)    Neurogenic bladder    Paralysis of both lower limbs (HCC)    Presence of intrathecal pump    Presence of IVC filter    Prolapsed internal hemorrhoids, grade 3    Right axis deviation    9/30/2022 ECG    Seizure disorder, status epilepticus, nonconvulsive (HCC)    Severe recurrent major depression without psychotic features (HCC)    Reconciled from care  everywhere.    Suicide and self-inflicted poisoning by unspecified drug or medicinal substance(E950.5)    Unspecified drug dependence, unspecified    at [facility]    Unspecified site of spinal cord injury without evidence of spinal bone injury    spinal compression t4-t6    Varicella        Past Surgical History:  Past Surgical History:   Procedure Laterality Date    Bone graft femur head/neck/ridge  03/2022    Cath inferior vena cava filter insertion  2012    Laminectomy,>2 sgmt,lumbar  1/20/2012    T6 paraplegic    Other surgical history      fusion/refusion vertebrae    Other surgical history  12/2015    intrathecal pain pump       Family History:  family history includes Allergies in his sister; Asthma in his sister; HTN in his father and paternal grandfather; Thyroid Disorder in his maternal grandfather; colon cancer in his maternal grandfather; diabetes mellitus in his paternal grandmother; hyperlipidemia in his father; stroke syndrome in his paternal grandfather.      Social History:   reports that he quit smoking about 9 years ago. His smoking use included cigarettes. He has never used smokeless tobacco. He reports current alcohol use of about 1.0 standard drink of alcohol per week. He reports current drug use. Frequency: 7.00 times per week. Drug: Cannabis.    Allergies:  Allergies   Allergen Reactions    Seasonal Runny nose       MEDICATIONS:    Current Outpatient Medications:     divalproex DR (DEPAKOTE) 500 MG Oral Tab EC DR tab, Take 1 tablet (500 mg total) by mouth 2 (two) times daily., Disp: 180 tablet, Rfl: 3    ALBUTEROL 108 (90 Base) MCG/ACT Inhalation Aero Soln, TAKE 2 PUFFS BY MOUTH EVERY 6 HOURS AS NEEDED FOR WHEEZE OR SHORTNESS OF BREATH, Disp: 8.5 each, Rfl: 0    divalproex  MG Oral Tablet 24 Hr, Take 2 tablets twice daily. Please contact clinic with dose verification- whether you are tolerating the dose., Disp: 120 tablet, Rfl: 0    Solifenacin Succinate 10 MG Oral Tab, Take 1 tablet  (10 mg total) by mouth daily., Disp: , Rfl:     Bupivacaine HCl 0.5 % Injection Solution, Intrathecal., Disp: , Rfl:     acetaminophen 325 MG Oral Tab, Take 2 tablets (650 mg total) by mouth as needed., Disp: , Rfl:     apixaban 5 MG Oral Tab, Take 1 tablet (5 mg total) by mouth 2 (two) times daily., Disp: , Rfl:     pregabalin 300 MG Oral Cap, Take 1 capsule (300 mg total) by mouth daily., Disp: , Rfl:     ARIPIPRAZOLE 2 MG Oral Tab, TAKE 1 TABLET BY MOUTH EVERY DAY, Disp: 30 tablet, Rfl: 0    DULOXETINE HCL 60 MG Oral Cap DR Particles, TAKE 1 CAPSULE (60 MG TOTAL) BY MOUTH ONCE DAILY. WITH 20MG = 80MG DAILY (Patient taking differently: 2 capsules (120 mg total) daily.), Disp: 90 capsule, Rfl: 0    NON FORMULARY, Suppository for Hemorrhoids as needed. OTC , Disp: , Rfl:     baclofen 2 mcg/mL in sodium chloride 0.9 % for pain pump, by Intrathecal route daily. Dosage unknown , Disp: , Rfl:     morphINE Sulfate (PF) in Sodium Chloride IT infusion, 0.85 mg by Intrathecal route daily.  , Disp: , Rfl:     lidocaine (LIDODERM) 5 % Apply Externally Patch, Place 2 patches onto the skin daily. As needed, Disp: , Rfl: 0    Bisacodyl 10 MG Rectal Suppos, Place 1 suppository (10 mg total) rectally as needed., Disp: , Rfl:     nicotine 7 MG/24HR Transdermal Patch 24 Hr, Place 1 patch onto the skin daily. (Patient not taking: Reported on 7/3/2024), Disp: 30 patch, Rfl: 0      Review of Systems:   A comprehensive 10 point review of systems was completed.     Pertinent positives and negatives noted in the HPI.         PHYSICAL EXAM:   Neurologic Exam  Vitals  Vitals:    07/03/24 1150   BP: 118/68   Pulse: 80   Resp: 16     General Appearance: Patient is a 29 year old male in no acute distress  Cardiac: Normal rate & regular rhythm  Skin: There are no rashes or other skin lesions.  Musculoskeletal: There is no scoliosis, or joint deformities  Neurologic examination:  Mental status: Patient is alert, attentive, and oriented x 3.  Language is coherent and fluent without aphasia. Memory, comprehension and ability to follow commands were intact.   Cranial nerves II-XII: Optic discs were sharp. Pupils were round and reacted to light. Extraocular movements were full. There was no face, jaw, palate or tongue weakness or atrophy. Facial sensation was normal. Hearing was grossly intact. Shoulder shrug was normal.   Motor exam revealed normal muscle bulk and tone. No atrophy or fasciculations. Manual muscle testing revealed MRC grade 5/5 strength throughout including proximal and distal muscles of the arms and legs.  Deep tendon reflexes were 2 at the biceps, brachioradialis, triceps, knee jerk, and ankle jerk. Plantar responses were flexor bilaterally.   Sensory exam revealed normal light touch perception. Vibratory perception and proprioception were intact at the toes. Pinprick and temperature were normal. Romberg sign was absent.  Complex motor skills revealed normal coordination. Finger-nose-finger intact.   Gait was narrow and stable, was able to walk on heels, toes and tandem without any difficulty.     ASSESSMENT/ACTIVE PROBLEM LIST:     Encounter Diagnoses   Name Primary?    Epilepsy with partial complex seizures (HCC) Yes    Spinal cord injury at T1-T6 level with complete spinal cord lesion, sequela (HCC)     Neuropathic pain     Current mild episode of major depressive disorder, unspecified whether recurrent (HCC)      Discussion/Plan:  Complex partial seizures- atypical prolonged episodes with psychomotor slowing, decreased interaction and responsiveness  Continue Depakote 500mg BID  Check Depakote level    Depression- on Cymbalta 120mg dialy    Neuropathic pain from spinal cord injury- on baclofen pump, history of multiple neuropathic agents not working and combination cream gabapentin/lidocain/meloxicam cream did not improve pain, improved significantly after myelotomy at [facility], on Lyrica 300mg and Cymbalta    Requested Prescriptions      Signed Prescriptions Disp Refills    divalproex DR (DEPAKOTE) 500 MG Oral Tab EC DR tab 180 tablet 3     Sig: Take 1 tablet (500 mg total) by mouth 2 (two) times daily.          We discussed in depth regarding the diagnosis, prognosis, treatment. The patient was given ample opportunity to ask questions. All questions and concerns were addressed.     Cassandra Valdivia DO  Neuromuscular and General Neurology  [facility]

## 2024-07-03 NOTE — PROGRESS NOTES
Patient here to follow up regarding seizures. Had episode on New Years, may have missed medication dose but is not sure. No episodes since then.

## 2024-07-03 NOTE — PATIENT INSTRUCTIONS
After your visit at the [place] office  today,  please direct any follow up questions or medication needs to the staff in our  [place] office so that your concerns may be promptly addressed.  We are available through [place] or at the numbers below:    The phone number is:   [phone] option #1    The fax number is:  [phone]    Your pharmacy should also send any requests electronically to the [place] office.    Refill policies:    Allow 2-3 business days for refills; controlled substances may take longer.  Contact your pharmacy at least 5 days prior to running out of medication and have them send an electronic request or submit request through the “request refill” option in your [place] account.  Refills are not addressed on weekends; covering physicians do not authorize routine medications on weekends.  No narcotics or controlled substances are refilled after noon on Fridays or by on call physicians.  By law, narcotics must be electronically prescribed.  A 30 day supply with no refills is the maximum allowed.  If your prescription is due for a refill, you may be due for a follow up appointment.  To best provide you care, patients receiving routine medications need to be seen at least once a year.  Patients receiving narcotic/controlled substance medications need to be seen at least once every 3 months.  In the event that your preferred pharmacy does not have the requested medication in stock (e.g. Backordered), it is your responsibility to find another pharmacy that has the requested medication available.  We will gladly send a new prescription to that pharmacy at your request.    Scheduling Tests:    If your physician has ordered radiology tests such as MRI or CT scans, please contact Central Scheduling at [phone] right away to schedule the test.  Once scheduled, the [facility] Centralized Referral Team will work with your insurance carrier to obtain pre-certification or prior authorization.   Depending on your insurance carrier, approval may take 3-10 days.  It is highly recommended patients assure they have received an authorization before having a test performed.  If test is done without insurance authorization, patient may be responsible for the entire amount billed.      Precertification and Prior Authorizations:  If your physician has recommended that you have a procedure or additional testing performed the [facility] Centralized Referral Team will contact your insurance carrier to obtain pre-certification or prior authorization.    You are strongly encouraged to contact your insurance carrier to verify that your procedure/test has been approved and is a COVERED benefit.  Although the [facility] Centralized Referral Team does its due diligence, the insurance carrier gives the disclaimer that \"Although the procedure is authorized, this does not guarantee payment.\"    Ultimately the patient is responsible for payment.   Thank you for your understanding in this matter.  Paperwork Completion:  If you require FMLA or disability paperwork for your recovery, please make sure to either drop it off or have it faxed to our office at [phone]. Be sure the form has your name and date of birth on it.  The form will be faxed to our Forms Department and they will complete it for you.  There is a 25$ fee for all forms that need to be filled out.  Please be aware there is a 10-14 day turnaround time.  You will need to sign a release of information (DAVE) form if your paperwork does not come with one.  You may call the Forms Department with any questions at [phone].  Their fax number is [phone].

## 2024-07-18 ENCOUNTER — TELEPHONE (OUTPATIENT)
Dept: FAMILY MEDICINE CLINIC | Facility: CLINIC | Age: 29
End: 2024-07-18

## 2024-07-18 DIAGNOSIS — F17.200 NICOTINE USE DISORDER: ICD-10-CM

## 2024-07-18 DIAGNOSIS — F17.299 OTHER TOBACCO PRODUCT NICOTINE DEPENDENCE WITH NICOTINE-INDUCED DISORDER: ICD-10-CM

## 2024-07-18 NOTE — TELEPHONE ENCOUNTER
Patient started vaping again 1 month ago and asking if you could refill his nicotine 7mg patches. Last prescribed 1/22/24. VV 1/22/24

## 2024-07-18 NOTE — TELEPHONE ENCOUNTER
Riaz Davies requesting medication refill for nicotine 7 MG/24HR Transdermal Patch 24 Hr . Patient started vaping again for 1 month and would like to stop.    LOV: 1/22/2024   Last Refill Date: 1/22/24  Pharmacy Location:   [id number] [address] AT [facility], [phone], [phone]     Prescribed By: Dr. Tierra Justice

## 2024-07-18 NOTE — TELEPHONE ENCOUNTER
Riaz Davies requesting medication refill for nicotine 7 MG/24HR Transdermal Patch 24 Hr . Patient started vaping again for 1 month and would like to stop.

## 2024-07-24 RX ORDER — DIVALPROEX SODIUM 250 MG/1
TABLET, EXTENDED RELEASE ORAL
Qty: 120 TABLET | Refills: 0 | OUTPATIENT
Start: 2024-07-24

## 2024-07-24 NOTE — TELEPHONE ENCOUNTER
Refused. Patient is no longer on this dosage.       Medication:  DIVALPROEX  MG Oral Tablet 24 Hr      Date of last refill: 04/16/2024 (#120/0)  [place]     Last office visit: 07/03/2024  Due back to clinic per last office note:  Around 07/03/2025  Date next office visit scheduled:    No future appointments.        Last OV note recommendation:    ASSESSMENT/ACTIVE PROBLEM LIST:           Encounter Diagnoses   Name Primary?    Epilepsy with partial complex seizures (HCC) Yes    Spinal cord injury at T1-T6 level with complete spinal cord lesion, sequela (HCC)      Neuropathic pain      Current mild episode of major depressive disorder, unspecified whether recurrent (HCC)        Discussion/Plan:  Complex partial seizures- atypical prolonged episodes with psychomotor slowing, decreased interaction and responsiveness  Continue Depakote 500mg BID  Check Depakote level     Depression- on Cymbalta 120mg dialy     Neuropathic pain from spinal cord injury- on baclofen pump, history of multiple neuropathic agents not working and combination cream gabapentin/lidocain/meloxicam cream did not improve pain, improved significantly after myelotomy at [facility], on Lyrica 300mg and Cymbalta

## 2024-08-19 ENCOUNTER — TELEPHONE (OUTPATIENT)
Dept: PHYSICAL MEDICINE AND REHAB | Age: 29
End: 2024-08-19

## 2024-08-21 ENCOUNTER — CLINICAL ABSTRACT (OUTPATIENT)
Dept: HEALTH INFORMATION MANAGEMENT | Facility: OTHER | Age: 29
End: 2024-08-21

## 2024-08-21 ENCOUNTER — APPOINTMENT (OUTPATIENT)
Dept: PHYSICAL MEDICINE AND REHAB | Age: 29
End: 2024-08-21

## 2024-08-21 VITALS — DIASTOLIC BLOOD PRESSURE: 85 MMHG | TEMPERATURE: 97.6 F | SYSTOLIC BLOOD PRESSURE: 129 MMHG | HEART RATE: 71 BPM

## 2024-08-21 DIAGNOSIS — T14.8XXA MULTIPLE WOUNDS OF SKIN: Primary | ICD-10-CM

## 2024-08-21 DIAGNOSIS — M62.838 MUSCLE SPASTICITY: ICD-10-CM

## 2024-08-21 DIAGNOSIS — G82.20 PARAPLEGIA, UNSPECIFIED  (CMD): ICD-10-CM

## 2024-10-04 ENCOUNTER — MED REC SCAN ONLY (OUTPATIENT)
Dept: FAMILY MEDICINE CLINIC | Facility: CLINIC | Age: 29
End: 2024-10-04

## 2024-10-14 ENCOUNTER — TELEPHONE (OUTPATIENT)
Dept: FAMILY MEDICINE CLINIC | Facility: CLINIC | Age: 29
End: 2024-10-14

## 2024-10-14 NOTE — TELEPHONE ENCOUNTER
Pre Surgical Appointment      DOS: 11/19/2024  Location: [facility]   Surgeon: Dr. Marcelo Bocanegra  Procedure: Left Eye Corneal Transplant Surgery   Pre-op Appointment Date: 11/7/2024    Orders received, check list started and placed in MA's basket.

## 2024-10-29 ENCOUNTER — TELEPHONE (OUTPATIENT)
Dept: FAMILY MEDICINE CLINIC | Facility: CLINIC | Age: 29
End: 2024-10-29

## 2024-10-29 NOTE — TELEPHONE ENCOUNTER
Left voicemail/sent [employer] message informing patient we will need to reschedule upcoming appointment on 11/7/24 with Dr. Tierra Justice due to not being in office at date/time of appointment.    Offered patient 10/30/24 at 11:30am. Asked patient to call back by 3:30pm today to confirm appointment or appointment would be offered to another patient.

## 2024-10-30 ENCOUNTER — OFFICE VISIT (OUTPATIENT)
Dept: FAMILY MEDICINE CLINIC | Facility: CLINIC | Age: 29
End: 2024-10-30
Payer: MEDICARE

## 2024-10-30 VITALS
HEIGHT: 70 IN | HEART RATE: 100 BPM | WEIGHT: 220 LBS | OXYGEN SATURATION: 95 % | SYSTOLIC BLOOD PRESSURE: 108 MMHG | BODY MASS INDEX: 31.5 KG/M2 | DIASTOLIC BLOOD PRESSURE: 72 MMHG | TEMPERATURE: 98 F

## 2024-10-30 DIAGNOSIS — E66.09 CLASS 1 OBESITY DUE TO EXCESS CALORIES WITH SERIOUS COMORBIDITY AND BODY MASS INDEX (BMI) OF 31.0 TO 31.9 IN ADULT: ICD-10-CM

## 2024-10-30 DIAGNOSIS — R00.0 SINUS TACHYCARDIA: ICD-10-CM

## 2024-10-30 DIAGNOSIS — H18.463: ICD-10-CM

## 2024-10-30 DIAGNOSIS — Z01.818 PREOP EXAMINATION: Primary | ICD-10-CM

## 2024-10-30 DIAGNOSIS — G82.20 PARALYSIS OF BOTH LOWER LIMBS (HCC): ICD-10-CM

## 2024-10-30 DIAGNOSIS — E66.811 CLASS 1 OBESITY DUE TO EXCESS CALORIES WITH SERIOUS COMORBIDITY AND BODY MASS INDEX (BMI) OF 31.0 TO 31.9 IN ADULT: ICD-10-CM

## 2024-10-30 DIAGNOSIS — Z79.899 ENCOUNTER FOR LONG-TERM CURRENT USE OF MEDICATION: ICD-10-CM

## 2024-10-30 DIAGNOSIS — F17.298 OTHER TOBACCO PRODUCT NICOTINE DEPENDENCE WITH OTHER NICOTINE-INDUCED DISORDER: ICD-10-CM

## 2024-10-30 DIAGNOSIS — S80.212A ABRASION, LEFT KNEE, INITIAL ENCOUNTER: ICD-10-CM

## 2024-10-30 DIAGNOSIS — J45.20 MILD INTERMITTENT ASTHMA WITHOUT COMPLICATION (HCC): ICD-10-CM

## 2024-10-30 DIAGNOSIS — G47.9 SLEEP DISTURBANCE: ICD-10-CM

## 2024-10-30 DIAGNOSIS — Z23 NEED FOR VACCINATION: ICD-10-CM

## 2024-10-30 PROCEDURE — 90656 IIV3 VACC NO PRSV 0.5 ML IM: CPT | Performed by: FAMILY MEDICINE

## 2024-10-30 PROCEDURE — 99499 UNLISTED E&M SERVICE: CPT | Performed by: FAMILY MEDICINE

## 2024-10-30 PROCEDURE — 93000 ELECTROCARDIOGRAM COMPLETE: CPT | Performed by: FAMILY MEDICINE

## 2024-10-30 PROCEDURE — G0008 ADMIN INFLUENZA VIRUS VAC: HCPCS | Performed by: FAMILY MEDICINE

## 2024-10-30 PROCEDURE — 99215 OFFICE O/P EST HI 40 MIN: CPT | Performed by: FAMILY MEDICINE

## 2024-10-30 RX ORDER — RIVAROXABAN 10 MG/1
10 TABLET, FILM COATED ORAL DAILY
COMMUNITY
Start: 2024-10-21

## 2024-10-30 RX ORDER — ALBUTEROL SULFATE 90 UG/1
2 INHALANT RESPIRATORY (INHALATION) EVERY 6 HOURS PRN
Qty: 18 G | Refills: 0 | Status: SHIPPED | OUTPATIENT
Start: 2024-10-30

## 2024-10-30 NOTE — PATIENT INSTRUCTIONS
-Please do your labs as soon as possible.  -Dr. Justice will be able to complete your preop note once you have completed your blood tests and she has reviewed them.    -Decrease the use of the nicotine by 1 lozenge/day to wean off the nicotine.      [facility]         2) [facility]   [id number] [place] [address]   To schedule an appointment, call [phone].

## 2024-10-30 NOTE — PROGRESS NOTES
Riaz Davies is a 29 year old male.    Chief Complaint   Patient presents with   • Pre-Op Exam     DOS: 11/19/2024  Location: [facility]  Surgeon: Marcelo Bocanegra  Procedure Left eye corneal transplant              HPI:       This is a 29-year-old obese  male with bilateral lower extremity paralysis, bilateral lower extremity DVT on Xarelto with IVC filter in place, mild intermittent asthma, and nicotine dependence who presents for preop history and physical with medical risk assessment as requested by his surgeon Dr. Bocanegra prior to left eye corneal transplant to be performed at [facility] scheduled for 11/19/2024.      Asthma:  Mild intermittent asthma.  Patient reports that he infrequently needs to use the albuterol MDI.  When using the albuterol MDI it is with good relief.  ACT in the office today: 23        Nicotine dependence:  It has been on going struggle for him to wean off nicotine.  He has tried nicotine patches that I have prescribed for him in the past.  Currently using nicotine lozenges he states are 2 mg each and uses approximately 15 lozenges/day.  Patient states he is no longer vaping nicotine but he does not smoke cigarettes.      Sleep disturbance:  Patient reports difficulty with sleeping.      History of bilateral lower extremity DVT:  Patient taking Xarelto and has IVC filter in place.        Current Outpatient Medications   Medication Sig Dispense Refill   • XARELTO 10 MG Oral Tab Take 1 tablet (10 mg total) by mouth daily.     • albuterol (VENTOLIN HFA) 108 (90 Base) MCG/ACT Inhalation Aero Soln Inhale 2 puffs into the lungs every 6 (six) hours as needed for Wheezing or Shortness of Breath (Cough). 18 g 0   • nicotine polacrilex (TGT NICOTINE) 2 MG Mouth/Throat Gum Take 15 each (30 mg total) by mouth daily.     • divalproex DR (DEPAKOTE) 500 MG Oral Tab EC DR tab Take 1 tablet (500 mg total) by mouth 2 (two) times daily. 180 tablet 3   •  ALBUTEROL 108 (90 Base) MCG/ACT Inhalation Aero Soln TAKE 2 PUFFS BY MOUTH EVERY 6 HOURS AS NEEDED FOR WHEEZE OR SHORTNESS OF BREATH 8.5 each 0   • Solifenacin Succinate 10 MG Oral Tab Take 1 tablet (10 mg total) by mouth daily.     • Bupivacaine HCl 0.5 % Injection Solution Intrathecal.     • pregabalin 300 MG Oral Cap Take 1 capsule (300 mg total) by mouth daily.     • ARIPIPRAZOLE 2 MG Oral Tab TAKE 1 TABLET BY MOUTH EVERY DAY 30 tablet 0   • DULOXETINE HCL 60 MG Oral Cap DR Particles TAKE 1 CAPSULE (60 MG TOTAL) BY MOUTH ONCE DAILY. WITH 20MG = 80MG DAILY 90 capsule 0   • baclofen 2 mcg/mL in sodium chloride 0.9 % for pain pump by Intrathecal route daily. Dosage unknown      • morphINE Sulfate (PF) in Sodium Chloride IT infusion 0.85 mg by Intrathecal route daily.       • lidocaine (LIDODERM) 5 % Apply Externally Patch Place 2 patches onto the skin daily. As needed  0   • Bisacodyl 10 MG Rectal Suppos Place 1 suppository (10 mg total) rectally as needed.     • acetaminophen 325 MG Oral Tab Take 2 tablets (650 mg total) by mouth as needed. (Patient not taking: Reported on 10/30/2024)     • NON FORMULARY Suppository for Hemorrhoids as needed. OTC  (Patient not taking: Reported on 10/30/2024)        Patient Active Problem List   Diagnosis   • Heartburn   • Unspecified constipation   • Paralysis of both lower limbs (HCC)   • History of spinal cord injury   • Muscle spasm   • Asthma in adult (HCC)   • Neurogenic bladder   • Prolapsed internal hemorrhoids, grade 3   • Attention deficit hyperactivity disorder (ADHD), predominantly inattentive type   • Fracture of thoracic vertebra, T1-T6 with complete cord lesion   • Encounter for long-term current use of medication   • Presence of intrathecal pump   • Presence of IVC filter   • History of DVT (deep vein thrombosis)   • Syringomyelia (HCC)   • Neurogenic bowel, not elsewhere classified   • Central pain syndrome   • Nicotine dependence   • Gynecomastia, male   • Mixed  hyperlipidemia   • Nicotine use disorder   • Major depressive disorder, recurrent severe without psychotic features (HCC)   • Low serum vitamin B12   • Mild intermittent asthma without complication (HCC)   • Symptomatic localization-related epilepsy (HCC)   • Class 1 obesity due to excess calories with serious comorbidity and body mass index (BMI) of 31.0 to 31.9 in adult   • Sleep disturbance   • Corneal degeneration, peripheral, bilateral   • Sinus tachycardia      Allergies[1]   Past Medical History:   • Altered mental status, unspecified altered mental status type   • Anal pain   • Asthma (HCC)   • Attention deficit hyperactivity disorder (ADHD), predominantly inattentive type   • Central pain syndrome   • Class 1 obesity due to excess calories with serious comorbidity and body mass index (BMI) of 31.0 to 31.9 in adult    Associated with mixed hyperlipidemia      • Coagulation defect (HCC)    Apixaban 5 mg twice a day.   • Coccygodynia   • Corneal degeneration, peripheral, bilateral   • Deliberate self-cutting   • Ecthyma   • Episode of abnormal behavior   • Fracture of thoracic vertebra, T1-T6 with complete cord lesion   • Heartburn   • History of DVT (deep vein thrombosis)    Bilateral lower extremities     • History of spinal cord injury   • Major depressive disorder, recurrent severe without psychotic features (HCC)   • Major depressive disorder, single episode in full remission (HCC)   • Mild intermittent asthma without complication (HCC)   • Mixed hyperlipidemia   • Moderate persistent asthma without complication (HCC)   • Mood disorder (HCC)   • Muscle spasm    2nd to spinal cord injury     • Neurogenic bladder   • Neurogenic bowel, not elsewhere classified   • Nicotine dependence    Vaping many multiple times a day     • Paralysis of both lower limbs (HCC)   • Presence of intrathecal pump   • Presence of IVC filter   • Prolapsed internal hemorrhoids, grade 3   • Right axis deviation    9/30/2022 ECG   •  Seizure disorder, status epilepticus, nonconvulsive (HCC)   • Severe recurrent major depression without psychotic features (HCC)    Reconciled from care everywhere.   • Sleep disturbance   • Suicide and self-inflicted poisoning by unspecified drug or medicinal substance(E950.5)   • Symptomatic localization-related epilepsy (HCC)   • Syringomyelia (HCC)    Thoracic     • Unspecified [facility] dependence, unspecified    [facility]   • Unspecified site of spinal cord injury without evidence of spinal bone injury    spinal compression t4-t6   • Varicella      Past Surgical History:   Procedure Laterality Date   • Bone graft femur head/neck/ridge  2022   • Cath inferior vena cava filter insertion     • Laminectomy,>2 sgmt,lumbar  2012    T6 paraplegic   • Other surgical history      fusion/refusion vertebrae   • Other surgical history  2015    intrathecal pain pump      Social History:  Social History     Socioeconomic History   • Marital status: Single   Tobacco Use   • Smoking status: Former     Current packs/day: 0.00     Types: Cigarettes     Quit date: 2014     Years since quittin.8   • Smokeless tobacco: Never   Vaping Use   • Vaping status: Former   • Substances: Nicotine, Flavoring   • Devices: Pre-filled or refillable cartridge, Pre-filled pod   Substance and Sexual Activity   • Alcohol use: Yes     Alcohol/week: 1.0 standard drink of alcohol     Types: 1 Shots of liquor per week     Comment: occ   • Drug use: Yes     Frequency: 7.0 times per week     Types: Cannabis     Comment: medical marijuanna occasionally   • Sexual activity: Not Currently   Other Topics Concern   • Caffeine Concern No     Comment: 1 cup of daily   • Exercise No     Comment: on hold   • Seat Belt Yes            REVIEW OF SYSTEMS:   Review of Systems   Constitutional:  Negative for chills, diaphoresis and fever.   HENT:  Negative for congestion, rhinorrhea, sinus pressure, sneezing and sore throat.    Eyes:  Negative  for pain, discharge and redness.   Respiratory:  Negative for cough, chest tightness, shortness of breath and wheezing.    Cardiovascular:  Negative for chest pain and palpitations.   Gastrointestinal:  Negative for abdominal pain, diarrhea, nausea and vomiting.   Endocrine: Negative for cold intolerance and heat intolerance.   Genitourinary:         Denies any changes.   Musculoskeletal:         No new muscle aches or joint pains.   Skin:         Abrasion of left knee, patient states he fell out of his wheelchair and scraped his knee on the sidewalk.   Neurological:  Negative for dizziness and headaches.   Psychiatric/Behavioral:  Negative for dysphoric mood.      EXAM:   /72 (BP Location: Right arm, Patient Position: Sitting, Cuff Size: adult)   Pulse 100   Temp 98 °F (36.7 °C) (Temporal)   Ht 5' 10\" (1.778 m)   Wt 220 lb (99.8 kg)   SpO2 95%   BMI 31.57 kg/m²  Estimated body mass index is 31.57 kg/m² as calculated from the following:    Height as of this encounter: 5' 10\" (1.778 m).    Weight as of this encounter: 220 lb (99.8 kg).  Physical Exam  Vitals reviewed.   Constitutional:       General: He is not in acute distress.     Appearance: He is well-developed. He is not ill-appearing.      Comments: Very pleasant overweight  male sitting in a wheelchair.   HENT:      Head: Normocephalic and atraumatic.      Right Ear: Tympanic membrane, ear canal and external ear normal.      Left Ear: Tympanic membrane, ear canal and external ear normal.      Nose: No rhinorrhea.      Mouth/Throat:      Mouth: Mucous membranes are moist.      Pharynx: No oropharyngeal exudate or posterior oropharyngeal erythema.   Eyes:      General: No scleral icterus.        Right eye: No discharge.         Left eye: No discharge.      Extraocular Movements: Extraocular movements intact.      Conjunctiva/sclera: Conjunctivae normal.   Neck:      Thyroid: No thyromegaly.   Cardiovascular:      Rate and Rhythm: Regular  rhythm. Tachycardia present.      Heart sounds: Normal heart sounds. No murmur heard.  Pulmonary:      Effort: Pulmonary effort is normal. No respiratory distress.      Breath sounds: Normal breath sounds. No wheezing, rhonchi or rales.   Abdominal:      Palpations: Abdomen is soft. There is no mass.      Tenderness: There is no abdominal tenderness.   Musculoskeletal:      Cervical back: Neck supple.      Comments: Trace bilateral lower extremity edema.   Lymphadenopathy:      Cervical: No cervical adenopathy.   Skin:     General: Skin is warm and dry.      Comments: Abrasion of left anterior knee, no induration, no exudate, no increased warmth.   Neurological:      Mental Status: He is alert and oriented to person, place, and time.      Cranial Nerves: No cranial nerve deficit.      Deep Tendon Reflexes: Reflexes are normal and symmetric.   Psychiatric:         Behavior: Behavior normal.               DATA:    The below labs were copied and pasted from care everywhere.    Glucose Random [ mg/dL] 99 mg/dL  (11/8/24 3:42 PM)   BUN [7-18 mg/dL] 9 mg/dL  (11/8/24 3:42 PM)   Creatinine [0.70-1.30 mg/dL] 0.58 mg/dL  *LOW*  (11/8/24 3:42 PM)   Sodium Lvl [135-145 mmol/L] 140 mmol/L  (11/8/24 3:42 PM)   Potassium Lvl [3.5-5.3 mmol/L] 3.8 mmol/L  (11/8/24 3:42 PM)   Chloride [ mmol/L] 108 mmol/L  (11/8/24 3:42 PM)   CO2 [21-32 mmol/L] 25 mmol/L  (11/8/24 3:42 PM)   Calcium Lvl [8.7-10.5 mg/dL] 9.8 mg/dL  (11/8/24 3:42 PM)   Alk Phos [ unit/L] 104 unit/L  (11/8/24 3:42 PM)   AST [15-37 unit/L] 21 unit/L  (11/8/24 3:42 PM)   ALT [12-78 unit/L] 32 unit/L  (11/8/24 3:42 PM)   Magnesium [1.8-2.4 mg/dL] 1.9 mg/dL  (11/8/24 9:40 PM)   Lipase Lvl [12-53 unit/L] 24 unit/L  (11/8/24 3:42 PM)   Bili Total [0.2-1.2 mg/dL] 0.5 mg/dL  (11/8/24 3:42 PM)   Bili Direct [0.0-0.2 mg/dL] 0.2 mg/dL  (11/8/24 3:42 PM)   Ammonia [11-32 mcmol/L] <10 mcmol/L  *LOW*  (11/8/24 9:40 PM)   GFR [>=60 mL/min/1.73m2] >100  mL/min/1.73m2  (11/8/24 3:42 PM)   UA Source Catheter  (11/8/24 10:16 PM)   UA Color Yellow  (11/8/24 10:16 PM)   UA Appear [Clear] Cloudy  (11/8/24 10:16 PM)   UA Glucose [Negative mg/dL] Negative mg/dL  (11/8/24 10:16 PM)   UA Bili [Negative] Moderate  *ABN*  (11/8/24 10:16 PM)   UA Ketones [Negative] >=160  (11/8/24 10:16 PM)   UA Spec Grav [1.005-1.030] 1.025  (11/8/24 10:16 PM)   UA Blood [Negative] Negative  (11/8/24 10:16 PM)   UA pH [5.0-8.0] 6.0  (11/8/24 10:16 PM)   UA Protein [Negative mg/dL] 30 mg/dL  *ABN*  (11/8/24 10:16 PM)   UA Urobilinogen [0.2-1.0] 1.0  (11/8/24 10:16 PM)   UA Nitrite [Negative] Negative  (11/8/24 10:16 PM)   UA Leuk Est [Negative] Small  *ABN*  (11/8/24 10:16 PM)   Total CK [<=196 unit/L] 319 unit/L  *HI*  (11/8/24 9:40 PM)   WBC [3.0-11.0 x10(9)/L] 8.9 x10(9)/L  (11/8/24 3:42 PM)   Hgb [13.0-18.0 gm/dL] 14.1 gm/dL  (11/8/24 3:42 PM)   Hct [40.0-53.0 %] 43.2 %  (11/8/24 3:42 PM)   Platelet [140-450 x10(9)/L] 278 x10(9)/L  (11/8/24 3:42 PM)             ASSESSMENT AND PLAN:     Encounter Diagnoses   Name Primary?   • Preop examination Yes   • Corneal degeneration, peripheral, bilateral    • Sinus tachycardia    • Mild intermittent asthma without complication (HCC)    • Encounter for long-term current use of medication    • Other tobacco product nicotine dependence with other nicotine-induced disorder    • Paralysis of both lower limbs (HCC)    • Sleep disturbance    • Class 1 obesity due to excess calories with serious comorbidity and body mass index (BMI) of 31.0 to 31.9 in adult    • Abrasion, left knee, initial encounter    • Need for vaccination          10/30/2024 ECG in the office:    Sinus tachycardia  Otherwise normal ECG  When compared with ECG of 11-APR-2019 13:46,  Vent. rate has increased BY  44 BPM  Confirmed by Tierra Justice ([id number]) on 11/12/2024 3:26:17 PM        Signed by: Tierra Justice on 11/12/2024  3:26 PM           This is a 29-year-old obese  male  with bilateral lower extremity paralysis, bilateral lower extremity DVT on Xarelto with IVC filter in place, mild intermittent asthma, and nicotine dependence who presents for preop history and physical with medical risk assessment as requested by his surgeon Dr. Bocanegra prior to left eye corneal transplant to be performed at [facility] scheduled for 11/19/2024.      History reviewed and physical exam performed.  Labs: CMP, WBCs, H&H, and platelets acceptable for surgery.  ECG: Abnormal.  Sinus tachycardia.  Otherwise normal.      This patient may proceed with corneal transplant under general anesthesia, patient is low risk for this procedure.      Please note that after patient's Preop office visit there was notification that patient was admitted to [facility], please see below.  The patient was advised to contact his surgeon regarding this recent hospitalization.    Date(s): 11/8/24 - 11/10/24  [facility] [address], [place] [address] [phone]  Encounter Diagnosis  Cholelithiasis (Discharge Diagnosis) - 11/8/24  Urinary tract infection (Discharge Diagnosis) - 11/8/24  Altered mental status (Discharge Diagnosis) - 11/8/24  Discharge Disposition: Home or Self Care  Attending Physician: Franklin Kent MD  Admitting Physician: Franklin Kent MD         Time spent was 40 minutes, more than 50% of time was spent on counseling regarding medical conditions and treatment.  Rest of time was spent reviewing chart, reviewing blood work and radiology tests.         1. Preop examination  2. Corneal degeneration, peripheral, bilateral  Patient to proceed with corneal transplant.    - EKG with interpretation and Report -IN OFFICE [93340]    3. Sinus tachycardia  Noted on ECG today.    4. Mild intermittent asthma without complication (HCC)  ACT in the office today: 23.  Recommend continue albuterol MDI as directed as needed.    - albuterol (VENTOLIN HFA) 108  (90 Base) MCG/ACT Inhalation Aero Soln; Inhale 2 puffs into the lungs every 6 (six) hours as needed for Wheezing or Shortness of Breath (Cough).  Dispense: 18 g; Refill: 0    5. Encounter for long-term current use of medication  Medication use, risks, benefits, side effects and precautions discussed, patient verbalizes understanding. Questions encouraged and answered to patient's satisfaction.    - albuterol (VENTOLIN HFA) 108 (90 Base) MCG/ACT Inhalation Aero Soln; Inhale 2 puffs into the lungs every 6 (six) hours as needed for Wheezing or Shortness of Breath (Cough).  Dispense: 18 g; Refill: 0    6. Other tobacco product nicotine dependence with other nicotine-induced disorder  Patient reports he is currently using 2 mg nicotine lozenges, he estimates 15 lozenges/day for total of 30 mg of nicotine daily.  Patient advised to wean off of nicotine and was offered assistance.  Recommend try decreasing the number of nicotine lozenges by 1 every day or every 1 to 3 days.    7. Paralysis of both lower limbs (HCC)  Patient states that he has had a car modified with hand controls and is interested in driving again.  Patient was strongly advised to obtain formal training for use of hand controls and was provided the below contact information.    ’s Assessment & Rehabilitation   [facility]   [id number] [address]   To schedule an appointment, call [phone].     8. Sleep disturbance  Etiology uncertain, may be multifactorial.  Patient referred to Dr. Germain, sleep specialist, and colleagues for further evaluation and care.    - Pulmonary Referral - In Network    9. Class 1 obesity due to excess calories with serious comorbidity and body mass index (BMI) of 31.0 to 31.9 in adult  Patient referred to the [place] weight loss clinic.    - [facility] - Carrie Begum PA-C [address]    10. Abrasion, left knee,  initial encounter  Does not appear infected.  Simple dressing with bacitracin applied in the office today.    11. Need for vaccination  - Fluzone trivalent vaccine, PF 0.5mL, 6mo+ (93080)              Orders Placed This Encounter   Procedures   • Fluzone trivalent vaccine, PF 0.5mL, 6mo+ (25502)       Meds & Refills for this Visit:  Requested Prescriptions     Signed Prescriptions Disp Refills   • albuterol (VENTOLIN HFA) 108 (90 Base) MCG/ACT Inhalation Aero Soln 18 g 0     Sig: Inhale 2 puffs into the lungs every 6 (six) hours as needed for Wheezing or Shortness of Breath (Cough).       Imaging & Consults:  ELECTROCARDIOGRAM, COMPLETE  INFLUENZA VACCINE, TRI, PRESERV FREE, 0.5 ML  PULMONARY - INTERNAL  OP REFERRAL TO WEIGHT LOSS CLINIC      Return in about 6 weeks (around 12/11/2024) for Medicare annual wellness visit and plan for weight loss.           [1]   Allergies  Allergen Reactions   • Seasonal Runny nose

## 2024-11-11 ENCOUNTER — TELEPHONE (OUTPATIENT)
Dept: FAMILY MEDICINE CLINIC | Facility: CLINIC | Age: 29
End: 2024-11-11

## 2024-11-11 NOTE — TELEPHONE ENCOUNTER
Patient calling and is having surgery 11/19/24 for cornea transplant and was just discharged from [facility] for UTI. He is asking if he is in need of labs prior to his upcoming surgery.    Please advise.

## 2024-11-11 NOTE — TELEPHONE ENCOUNTER
Spoke with patient. Patient will update this office regarding pre op so that his office note can be completed.

## 2024-11-11 NOTE — TELEPHONE ENCOUNTER
Looks like he had cbc and bmp at [facility] on 11/8  From our end, that should likely be fine  But he still needs to clarify with surgeon/preop office

## 2024-11-11 NOTE — TELEPHONE ENCOUNTER
Patient was admitted to [facility] 11/8 discharged 11/10 for UTI. Patient was treated with iv antibiotics and sent home with 7 day script for oral antibiotics. Patient is feeling much better. Patient is to have left eye corneal transplant 11/18 and is concerned if he will need more labs. Patient informed that Dr Justice is out of the office and to call Dr Bocanegra to verify if they require any labs. Patient stated that they too are out of the office for a couple of days. His pre op appointment was 10/30.     Does patient need new labs?     Please review and advise.

## 2024-11-12 PROBLEM — H18.463: Status: ACTIVE | Noted: 2024-11-12

## 2024-11-12 PROBLEM — G47.9 SLEEP DISTURBANCE: Status: ACTIVE | Noted: 2024-11-12

## 2024-11-12 PROBLEM — R00.0 SINUS TACHYCARDIA: Status: ACTIVE | Noted: 2024-11-12

## 2024-11-12 LAB
ATRIAL RATE: 116 BPM
P AXIS: 62 DEGREES
P-R INTERVAL: 146 MS
Q-T INTERVAL: 314 MS
QRS DURATION: 74 MS
QTC CALCULATION (BEZET): 436 MS
R AXIS: 17 DEGREES
T AXIS: 53 DEGREES
VENTRICULAR RATE: 116 BPM

## 2024-11-13 ENCOUNTER — APPOINTMENT (OUTPATIENT)
Dept: PHYSICAL MEDICINE AND REHAB | Age: 29
End: 2024-11-13

## 2024-11-13 ENCOUNTER — TELEPHONE (OUTPATIENT)
Dept: FAMILY MEDICINE CLINIC | Facility: CLINIC | Age: 29
End: 2024-11-13

## 2024-11-13 VITALS
SYSTOLIC BLOOD PRESSURE: 113 MMHG | DIASTOLIC BLOOD PRESSURE: 72 MMHG | TEMPERATURE: 97.3 F | RESPIRATION RATE: 17 BRPM | HEART RATE: 75 BPM

## 2024-11-13 DIAGNOSIS — M62.838 MUSCLE SPASTICITY: Primary | ICD-10-CM

## 2024-11-13 DIAGNOSIS — G82.20 PARAPLEGIA, UNSPECIFIED (CMD): ICD-10-CM

## 2024-11-13 ASSESSMENT — PAIN SCALES - GENERAL: PAINLEVEL: 0

## 2024-11-13 NOTE — TELEPHONE ENCOUNTER
temo , from  [facility], requesting pre-op visit notes and medical clearance and most recent lab work to be faxed.     TEMO  [phone]

## 2024-11-14 ENCOUNTER — TELEPHONE (OUTPATIENT)
Dept: FAMILY MEDICINE CLINIC | Facility: CLINIC | Age: 29
End: 2024-11-14

## 2024-11-14 NOTE — TELEPHONE ENCOUNTER
Silvina from [facility] called asking for EKG tracing to be fax as well as labs. Labs were in office note, EKG printed and faxed to number provided.

## 2024-11-14 NOTE — TELEPHONE ENCOUNTER
Faxed all required documents; H&P along with labs and EKG to Dr Bocanegra office [phone] as well as notified the patient of his clearance.

## 2024-11-18 RX ORDER — DULOXETIN HYDROCHLORIDE 60 MG/1
60 CAPSULE, DELAYED RELEASE ORAL DAILY
Qty: 90 CAPSULE | Refills: 1 | Status: SHIPPED | OUTPATIENT
Start: 2024-11-18

## 2024-11-27 DIAGNOSIS — J45.20 MILD INTERMITTENT ASTHMA WITHOUT COMPLICATION (HCC): ICD-10-CM

## 2024-11-27 DIAGNOSIS — Z79.899 ENCOUNTER FOR LONG-TERM CURRENT USE OF MEDICATION: ICD-10-CM

## 2024-11-27 RX ORDER — ALBUTEROL SULFATE 90 UG/1
2 INHALANT RESPIRATORY (INHALATION) EVERY 6 HOURS PRN
Qty: 6.7 EACH | Refills: 0 | Status: SHIPPED | OUTPATIENT
Start: 2024-11-27

## 2024-12-18 ENCOUNTER — TELEMEDICINE (OUTPATIENT)
Dept: FAMILY MEDICINE CLINIC | Facility: CLINIC | Age: 29
End: 2024-12-18
Payer: MEDICARE

## 2024-12-18 DIAGNOSIS — K62.89 RECTAL PAIN: ICD-10-CM

## 2024-12-18 DIAGNOSIS — Z87.19 HISTORY OF HEMORRHOIDS: ICD-10-CM

## 2024-12-18 DIAGNOSIS — K62.5 RECTAL BLEEDING: Primary | ICD-10-CM

## 2024-12-18 PROCEDURE — G2211 COMPLEX E/M VISIT ADD ON: HCPCS | Performed by: FAMILY MEDICINE

## 2024-12-18 PROCEDURE — 99213 OFFICE O/P EST LOW 20 MIN: CPT | Performed by: FAMILY MEDICINE

## 2024-12-18 RX ORDER — ARIPIPRAZOLE 10 MG/1
10 TABLET ORAL DAILY
COMMUNITY
Start: 2024-11-05

## 2024-12-18 RX ORDER — DIFLUPREDNATE OPHTHALMIC 0.5 MG/ML
EMULSION OPHTHALMIC 4 TIMES DAILY
COMMUNITY
Start: 2024-12-12

## 2024-12-18 RX ORDER — LIDOCAINE HCL AND HYDROCORTISONE ACETATE 20; 20 MG/G; MG/G
1 CREAM RECTAL 2 TIMES DAILY PRN
Qty: 1 KIT | Refills: 0 | Status: SHIPPED | OUTPATIENT
Start: 2024-12-18

## 2024-12-18 NOTE — PROGRESS NOTES
MyChart video visit with live interactive synchronous audio and video    Riaz Davies verbally consents to a [employer] video visit with live interactive synchronous audio and video service on 12/18/24.  Patient has been referred to the [facility] website at [email or web address] to review the yearly Consent to Treat document.  Patient understands and accepts financial responsibility for any deductible, co-insurance and/or co-pays associated with this service.        Please note that the following visit was completed using two-way, real-time interactive audio and/or video communication.  This has been done in good adelina to provide continuity of care in the best interest of the provider-patient relationship, due to the ongoing public health crisis/national emergency and because of restrictions of visitation.  There are limitations of this visit as no physical exam could be performed.  Every conscious effort was taken to allow for sufficient and adequate time.  This billing was spent on reviewing labs, medications, radiology tests and decision making.  Appropriate medical decision-making and tests are ordered as detailed in the plan of care above.    Time spent was 20 minutes, more than 50% of time was spent on counseling regarding medical conditions and treatment.  Rest of time was spent reviewing chart, and reviewing any pertinent blood work and radiology tests.       Riaz Davies is a 29 year old male.    Chief Complaint   Patient presents with    Hemorrhoids     F/u         HPI:         Rectal pain:  Throbbing.  Constant.  Severity varies throughout the day.  Associated with rectal bleeding.  Had a procedure past, he thinks ligation, that worked for a few months.  Denies constipation.      Recently underwent corneal transplant.      Current Outpatient Medications   Medication Sig Dispense Refill    difluprednate 0.05 % Ophthalmic Emulsion Place into the left eye 4 (four) times daily.      ARIPiprazole 10 MG  Oral Tab Take 1 tablet (10 mg total) by mouth daily.      Lidocaine-Hydrocortisone Ace 2-2 % Rectal Kit Place 1 Application rectally 2 (two) times daily as needed. 1 kit 0    ALBUTEROL 108 (90 Base) MCG/ACT Inhalation Aero Soln TAKE 2 PUFFS EVERY 6 HOURS AS NEEDED FOR WHEEZE OR FOR SHORTNESS OF BREATH OR COUGH 6.7 each 0    XARELTO 10 MG Oral Tab Take 1 tablet (10 mg total) by mouth daily.      nicotine polacrilex (TGT NICOTINE) 2 MG Mouth/Throat Gum Take 15 each (30 mg total) by mouth daily.      ALBUTEROL 108 (90 Base) MCG/ACT Inhalation Aero Soln TAKE 2 PUFFS BY MOUTH EVERY 6 HOURS AS NEEDED FOR WHEEZE OR SHORTNESS OF BREATH 8.5 each 0    Solifenacin Succinate 10 MG Oral Tab Take 1 tablet (10 mg total) by mouth daily.      pregabalin 300 MG Oral Cap Take 1 capsule (300 mg total) by mouth daily.      DULOXETINE HCL 60 MG Oral Cap DR Particles TAKE 1 CAPSULE (60 MG TOTAL) BY MOUTH ONCE DAILY. WITH 20MG = 80MG DAILY 90 capsule 0    NON FORMULARY Suppository for Hemorrhoids as needed. OTC      baclofen 2 mcg/mL in sodium chloride 0.9 % for pain pump by Intrathecal route daily. Dosage unknown       morphINE Sulfate (PF) in Sodium Chloride IT infusion 0.85 mg by Intrathecal route daily.        lidocaine (LIDODERM) 5 % Apply Externally Patch Place 2 patches onto the skin daily. As needed  0    Bisacodyl 10 MG Rectal Suppos Place 1 suppository (10 mg total) rectally as needed.        Patient Active Problem List   Diagnosis    Heartburn    Unspecified constipation    Paralysis of both lower limbs (HCC)    History of spinal cord injury    Muscle spasm    Asthma in adult (HCC)    Neurogenic bladder    Prolapsed internal hemorrhoids, grade 3    Attention deficit hyperactivity disorder (ADHD), predominantly inattentive type    Fracture of thoracic vertebra, T1-T6 with complete cord lesion    Encounter for long-term current use of medication    Presence of intrathecal pump    Presence of IVC filter    History of DVT (deep vein  thrombosis)    Syringomyelia (HCC)    Neurogenic bowel, not elsewhere classified    Central pain syndrome    Nicotine dependence    Gynecomastia, male    Mixed hyperlipidemia    Nicotine use disorder    Major depressive disorder, recurrent severe without psychotic features (HCC)    Low serum vitamin B12    Mild intermittent asthma without complication (HCC)    Symptomatic localization-related epilepsy (HCC)    Class 1 obesity due to excess calories with serious comorbidity and body mass index (BMI) of 31.0 to 31.9 in adult    Sleep disturbance    Corneal degeneration, peripheral, bilateral    Sinus tachycardia      Past Medical History:    Altered mental status, unspecified altered mental status type    Anal pain    Asthma (HCC)    Attention deficit hyperactivity disorder (ADHD), predominantly inattentive type    Central pain syndrome    Class 1 obesity due to excess calories with serious comorbidity and body mass index (BMI) of 31.0 to 31.9 in adult    Associated with mixed hyperlipidemia       Coagulation defect (HCC)    Apixaban 5 mg twice a day.    Coccygodynia    Corneal degeneration, peripheral, bilateral    Deliberate self-cutting    Ecthyma    Episode of abnormal behavior    Fracture of thoracic vertebra, T1-T6 with complete cord lesion    Heartburn    History of DVT (deep vein thrombosis)    Bilateral lower extremities      History of spinal cord injury    Major depressive disorder, recurrent severe without psychotic features (HCC)    Major depressive disorder, single episode in full remission (HCC)    Mild intermittent asthma without complication (HCC)    Mixed hyperlipidemia    Moderate persistent asthma without complication (HCC)    Mood disorder (HCC)    Muscle spasm    2nd to spinal cord injury      Neurogenic bladder    Neurogenic bowel, not elsewhere classified    Nicotine dependence    Vaping many multiple times a day      Paralysis of both lower limbs (HCC)    Presence of intrathecal pump     Presence of IVC filter    Prolapsed internal hemorrhoids, grade 3    Right axis deviation    9/30/2022 ECG    Seizure disorder, status epilepticus, nonconvulsive (HCC)    Severe recurrent major depression without psychotic features (HCC)    Reconciled from care everywhere.    Sleep disturbance    Suicide and self-inflicted poisoning by unspecified drug or medicinal substance(E950.5)    Symptomatic localization-related epilepsy (HCC)    Syringomyelia (HCC)    Thoracic      Unspecified drug dependence, unspecified    at [facility]    Unspecified site of spinal cord injury without evidence of spinal bone injury    spinal compression t4-t6    Varicella      Social History:  Social History     Socioeconomic History    Marital status: Single   Tobacco Use    Smoking status: Former     Current packs/day: 0.00     Types: Cigarettes     Quit date: 12/31/2014     Years since quitting: 10.0    Smokeless tobacco: Never   Vaping Use    Vaping status: Former    Substances: Nicotine, Flavoring    Devices: Pre-filled or refillable cartridge, Pre-filled pod   Substance and Sexual Activity    Alcohol use: Yes     Alcohol/week: 1.0 standard drink of alcohol     Types: 1 Shots of liquor per week     Comment: occ    Drug use: Yes     Frequency: 7.0 times per week     Types: Cannabis     Comment: medical marijuanna occasionally    Sexual activity: Not Currently   Other Topics Concern    Caffeine Concern No     Comment: 1 cup of daily    Exercise No     Comment: on hold    Seat Belt Yes        REVIEW OF SYSTEMS:   GENERAL: Overall feels well.  SKIN: No new rashes.  EYES: As in HPI  HEENT: Denies sore throat or other upper respiratory symptoms.  LUNGS: Denies cough or shortness of breath  CARDIOVASCULAR: Denies chest pain or palpitations.  GI: Denies abdominal pain, nausea, vomiting, or diarrhea  MUSCULOSKELETAL: Denies any new muscle aches or joint pains.  NEURO: Denies headache or dizziness.  PSYCH: Denies depression or  anxiety.      EXAM:   There were no vitals taken for this visit.  GENERAL: Pleasant.  Comfortably speaking in full sentences. non-ill appearing.  HEENT: Normal conjunctiva.  No nasal discharge.  LUNGS: No cough during video visit.  No audible dyspnea.  No audible wheezing.  NEURO: Alert and oriented x3  PSYCH: Normal affect.  Intonation of voice is normal.  No pressured speech.        Immunization History   Administered Date(s) Administered    Covid-19 Vaccine Pfizer 30 mcg/0.3 ml 08/03/2021, 08/30/2021    DTAP 05/05/1993, 05/05/1993, 07/28/1993, 07/28/1993, 09/29/1993, 09/29/1993, 08/25/1994, 08/25/1994, 05/05/1998, 05/05/1998    DTP 05/05/1993, 07/28/1993, 09/29/1993, 08/25/1994    FLULAVAL 6 months & older 0.5 ml Prefilled syringe ([id number]) 10/03/2018, 10/09/2019    FLUZONE 6 months and older PFS 0.5 ml ([id number]) 09/18/2012, 10/03/2018    Fluvirin, 3 Years & >, Im 01/21/2012    HEP B 02/23/1994    HEP B, Ped/Adol 05/05/1993, 07/28/1993, 02/23/1994, 02/25/1994    Hib, Unspecified Formulation 05/05/1993, 07/28/1993, 09/29/1993, 05/25/1994    IPV 05/05/1993, 05/05/1993, 07/28/1993, 07/28/1993, 08/25/1994, 08/25/1994, 05/05/1998, 05/05/1998    Influenza 09/18/2012, 09/18/2012, 11/16/2015, 10/03/2018, 10/09/2019    Influenza Vaccine, trivalent (IIV3), PF 0.5mL ([id number]) 10/30/2024    MMR 05/25/1994, 05/05/1998    Meningococcal-Menactra 05/17/2007    OPV 05/05/1993, 07/28/1993, 08/25/1994, 05/05/1998    TDAP 05/17/2007, 12/11/2019   Pended Date(s) Pended    FLULAVAL 6 months & older 0.5 ml Prefilled syringe ([id number]) 09/30/2022               ASSESSMENT AND PLAN:     Encounter Diagnoses   Name Primary?    Rectal bleeding Yes    Rectal pain     History of hemorrhoids        1. Rectal bleeding  2. Rectal pain  3. History of hemorrhoids  Prescription for topical lidocaine-hydrocortisone.  Patient advised to schedule an appointment to see Dr. Guerrero, surgeon, who he has seen in the past.    - Lidocaine-Hydrocortisone Ace 2-2 %  Rectal Kit; Place 1 Application rectally 2 (two) times daily as needed.  Dispense: 1 kit; Refill: 0          Meds & Refills for this Visit:  Requested Prescriptions     Signed Prescriptions Disp Refills    Lidocaine-Hydrocortisone Ace 2-2 % Rectal Kit 1 kit 0     Sig: Place 1 Application rectally 2 (two) times daily as needed.         Return in about 4 weeks (around 1/15/2025) for Medicare annual wellness visit.

## 2025-01-15 ENCOUNTER — TELEPHONE (OUTPATIENT)
Dept: PHYSICAL MEDICINE AND REHAB | Age: 30
End: 2025-01-15

## 2025-02-04 ENCOUNTER — APPOINTMENT (OUTPATIENT)
Dept: PHYSICAL MEDICINE AND REHAB | Age: 30
End: 2025-02-04

## 2025-02-04 DIAGNOSIS — G82.20 PARAPLEGIA, UNSPECIFIED (CMD): ICD-10-CM

## 2025-02-04 DIAGNOSIS — M62.838 MUSCLE SPASTICITY: Primary | ICD-10-CM

## 2025-02-05 ENCOUNTER — APPOINTMENT (OUTPATIENT)
Dept: PHYSICAL MEDICINE AND REHAB | Age: 30
End: 2025-02-05

## 2025-02-12 ENCOUNTER — OFFICE VISIT (OUTPATIENT)
Dept: FAMILY MEDICINE CLINIC | Facility: CLINIC | Age: 30
End: 2025-02-12
Payer: MEDICARE

## 2025-02-12 VITALS
OXYGEN SATURATION: 98 % | SYSTOLIC BLOOD PRESSURE: 108 MMHG | RESPIRATION RATE: 18 BRPM | DIASTOLIC BLOOD PRESSURE: 70 MMHG | HEART RATE: 114 BPM | HEIGHT: 70 IN | WEIGHT: 220 LBS | TEMPERATURE: 98 F | BODY MASS INDEX: 31.5 KG/M2

## 2025-02-12 DIAGNOSIS — K64.9 HEMORRHOIDS, UNSPECIFIED HEMORRHOID TYPE: ICD-10-CM

## 2025-02-12 DIAGNOSIS — J45.20 MILD INTERMITTENT ASTHMA WITHOUT COMPLICATION (HCC): ICD-10-CM

## 2025-02-12 DIAGNOSIS — G82.20 PARALYSIS OF BOTH LOWER LIMBS (HCC): ICD-10-CM

## 2025-02-12 DIAGNOSIS — Z01.818 PREOP EXAMINATION: Primary | ICD-10-CM

## 2025-02-12 PROCEDURE — 99214 OFFICE O/P EST MOD 30 MIN: CPT | Performed by: FAMILY MEDICINE

## 2025-02-12 PROCEDURE — G2211 COMPLEX E/M VISIT ADD ON: HCPCS | Performed by: FAMILY MEDICINE

## 2025-02-12 RX ORDER — MORPHINE SULFATE 100 %
POWDER (GRAM) MISCELLANEOUS
COMMUNITY
Start: 2025-01-30

## 2025-02-12 NOTE — PROGRESS NOTES
Riaz Davies is a 29 year old male.      Chief Complaint   Patient presents with    Pre-Op Exam     DOS 02/24/25  Hosea Guerrero MD    Asthma                HPI:       Patient presents with his mother who is very pleasant and supportive.    This is a 29-year-old  male with asthma, nicotine dependence, history of DVT with IVC filter in place and paralysis of bilateral lower extremities who presents for preop history and physical with medical risk assessment as requested by his surgeon Dr. Guerrero prior to hemorrhoidectomy scheduled for 2/24/2025, patient thinks at [facility] under general anesthesia.      Asthma:  Mild intermittent asthma.  Patient reports that he infrequently needs to use the albuterol MDI.  When using the albuterol MDI it is with good relief.  ACT in the office today: 25           Nicotine dependence:  It has been on going struggle for him to wean off nicotine.  Currently using nicotine lozenges he states are 2 mg each, he is not sure how many lozenges a day he is consuming.  Patient states he is no longer vaping nicotine but he does not smoke cigarettes.          History of bilateral lower extremity DVT:  Patient taking Xarelto and has IVC filter in place.  Pt reports he was told to stop the xarelto 72 hours prior to the procedure.        Current Outpatient Medications   Medication Sig Dispense Refill    Morphine Sulfate Does not apply Powder       difluprednate 0.05 % Ophthalmic Emulsion Place into the left eye 4 (four) times daily.      ARIPiprazole 10 MG Oral Tab Take 1 tablet (10 mg total) by mouth daily. Takes 1 and a half 15mg      Lidocaine-Hydrocortisone Ace 2-2 % Rectal Kit Place 1 Application rectally 2 (two) times daily as needed. 1 kit 0    ALBUTEROL 108 (90 Base) MCG/ACT Inhalation Aero Soln TAKE 2 PUFFS EVERY 6 HOURS AS NEEDED FOR WHEEZE OR FOR SHORTNESS OF BREATH OR COUGH 6.7 each 0    XARELTO 10 MG Oral Tab Take 1 tablet (10 mg total) by mouth daily.      nicotine  polacrilex (TGT NICOTINE) 2 MG Mouth/Throat Gum Take 15 each (30 mg total) by mouth daily.      ALBUTEROL 108 (90 Base) MCG/ACT Inhalation Aero Soln TAKE 2 PUFFS BY MOUTH EVERY 6 HOURS AS NEEDED FOR WHEEZE OR SHORTNESS OF BREATH 8.5 each 0    Solifenacin Succinate 10 MG Oral Tab Take 1 tablet (10 mg total) by mouth daily.      pregabalin 300 MG Oral Cap Take 1 capsule (300 mg total) by mouth daily.      DULOXETINE HCL 60 MG Oral Cap DR Particles TAKE 1 CAPSULE (60 MG TOTAL) BY MOUTH ONCE DAILY. WITH 20MG = 80MG DAILY 90 capsule 0    NON FORMULARY Suppository for Hemorrhoids as needed. OTC      baclofen 2 mcg/mL in sodium chloride 0.9 % for pain pump by Intrathecal route daily. Dosage unknown       morphINE Sulfate (PF) in Sodium Chloride IT infusion 0.85 mg by Intrathecal route daily.        lidocaine (LIDODERM) 5 % Apply Externally Patch Place 2 patches onto the skin daily. As needed  0    Bisacodyl 10 MG Rectal Suppos Place 1 suppository (10 mg total) rectally as needed.        Patient Active Problem List   Diagnosis    Heartburn    Unspecified constipation    Paralysis of both lower limbs (HCC)    History of spinal cord injury    Muscle spasm    Asthma in adult (HCC)    Neurogenic bladder    Prolapsed internal hemorrhoids, grade 3    Attention deficit hyperactivity disorder (ADHD), predominantly inattentive type    Fracture of thoracic vertebra, T1-T6 with complete cord lesion    Encounter for long-term current use of medication    Presence of intrathecal pump    Presence of IVC filter    History of DVT (deep vein thrombosis)    Syringomyelia (HCC)    Neurogenic bowel, not elsewhere classified    Central pain syndrome    Nicotine dependence    Gynecomastia, male    Mixed hyperlipidemia    Nicotine use disorder    Major depressive disorder, recurrent severe without psychotic features (HCC)    Low serum vitamin B12    Mild intermittent asthma without complication (HCC)    Symptomatic localization-related epilepsy  (HCC)    Class 1 obesity due to excess calories with serious comorbidity and body mass index (BMI) of 31.0 to 31.9 in adult    Sleep disturbance    Corneal degeneration, peripheral, bilateral    Sinus tachycardia    Hemorrhoids      Allergies[1]   Past Medical History:    Altered mental status, unspecified altered mental status type    Anal pain    Asthma (HCC)    Attention deficit hyperactivity disorder (ADHD), predominantly inattentive type    Central pain syndrome    Class 1 obesity due to excess calories with serious comorbidity and body mass index (BMI) of 31.0 to 31.9 in adult    Associated with mixed hyperlipidemia       Coagulation defect (HCC)    Apixaban 5 mg twice a day.    Coccygodynia    Corneal degeneration, peripheral, bilateral    Deliberate self-cutting    Ecthyma    Episode of abnormal behavior    Fracture of thoracic vertebra, T1-T6 with complete cord lesion    Heartburn    History of DVT (deep vein thrombosis)    Bilateral lower extremities      History of spinal cord injury    Major depressive disorder, recurrent severe without psychotic features (HCC)    Major depressive disorder, single episode in full remission    Mild intermittent asthma without complication (HCC)    Mixed hyperlipidemia    Moderate persistent asthma without complication (HCC)    Mood disorder    Muscle spasm    2nd to spinal cord injury      Neurogenic bladder    Neurogenic bowel, not elsewhere classified    Nicotine dependence    Vaping many multiple times a day      Paralysis of both lower limbs (HCC)    Presence of intrathecal pump    Presence of IVC filter    Prolapsed internal hemorrhoids, grade 3    Right axis deviation    9/30/2022 ECG    Seizure disorder, status epilepticus, nonconvulsive (HCC)    Severe recurrent major depression without psychotic features (HCC)    Reconciled from care everywhere.    Sleep disturbance    Suicide and self-inflicted poisoning by unspecified drug or medicinal substance(E950.5)     Symptomatic localization-related epilepsy (HCC)    Syringomyelia (HCC)    Thoracic      Unspecified drug dependence, unspecified    at [facility]    Unspecified site of spinal cord injury without evidence of spinal bone injury    spinal compression t4-t6    Varicella      Past Surgical History:   Procedure Laterality Date    Bone graft femur head/neck/ridge  03/2022    Cath inferior vena cava filter insertion  2012    Laminectomy,>2 sgmt,lumbar  1/20/2012    T6 paraplegic    Other surgical history      fusion/refusion vertebrae    Other surgical history  12/2015    intrathecal pain pump      Social History:  Social History     Socioeconomic History    Marital status: Single   Tobacco Use    Smoking status: Former     Current packs/day: 0.00     Types: Cigarettes     Quit date: 12/31/2014     Years since quitting: 10.1    Smokeless tobacco: Never   Vaping Use    Vaping status: Former    Substances: Nicotine, Flavoring    Devices: Pre-filled or refillable cartridge, Pre-filled pod   Substance and Sexual Activity    Alcohol use: Yes     Alcohol/week: 1.0 standard drink of alcohol     Types: 1 Shots of liquor per week     Comment: occ    Drug use: Yes     Frequency: 7.0 times per week     Types: Cannabis     Comment: medical marijuanna occasionally    Sexual activity: Not Currently   Other Topics Concern    Caffeine Concern No     Comment: 1 cup of daily    Exercise No     Comment: on hold    Seat Belt Yes     Social Drivers of Health     Food Insecurity: No Food Insecurity (2/12/2025)    NCSS - Food Insecurity     Worried About Running Out of Food in the Last Year: No     Ran Out of Food in the Last Year: No   Transportation Needs: No Transportation Needs (2/12/2025)    NCSS - Transportation     Lack of Transportation: No   Housing Stability: Not At Risk (2/12/2025)    NCSS - Housing/Utilities     Has Housing: Yes     Worried About Losing Housing: No     Unable to Get Utilities: No            REVIEW OF SYSTEMS:    Review of Systems   Constitutional:  Negative for chills, diaphoresis and fever.   HENT:  Negative for rhinorrhea, sneezing, sore throat and trouble swallowing.    Eyes:  Negative for pain and visual disturbance.   Respiratory:  Negative for cough, chest tightness and shortness of breath.    Cardiovascular:  Negative for chest pain, palpitations and leg swelling.   Gastrointestinal:  Negative for abdominal pain, diarrhea, nausea and vomiting.   Genitourinary:  Negative for difficulty urinating and dysuria.   Musculoskeletal:  Negative for arthralgias and myalgias.   Skin:  Negative for color change and rash.   Neurological:  Negative for headaches.   Hematological:  Does not bruise/bleed easily.   Psychiatric/Behavioral:  Negative for dysphoric mood and sleep disturbance.        EXAM:   /70   Pulse 114   Temp 98.1 °F (36.7 °C) (Temporal)   Resp 18   Ht 5' 10\" (1.778 m)   Wt 220 lb (99.8 kg)   SpO2 98%   BMI 31.57 kg/m²  Estimated body mass index is 31.57 kg/m² as calculated from the following:    Height as of this encounter: 5' 10\" (1.778 m).    Weight as of this encounter: 220 lb (99.8 kg).  Physical Exam  Vitals and nursing note reviewed.   Constitutional:       General: He is not in acute distress.     Appearance: He is well-developed. He is not ill-appearing.      Comments: Pleasant 29-year-old  male sitting in a wheelchair   HENT:      Head: Normocephalic and atraumatic.      Right Ear: Tympanic membrane and ear canal normal. There is no impacted cerumen.      Left Ear: Tympanic membrane and ear canal normal. There is no impacted cerumen.      Nose: No rhinorrhea.      Mouth/Throat:      Mouth: Mucous membranes are moist.      Pharynx: No oropharyngeal exudate or posterior oropharyngeal erythema.   Eyes:      General: No scleral icterus.     Extraocular Movements: Extraocular movements intact.      Conjunctiva/sclera: Conjunctivae normal.   Neck:      Thyroid: No thyromegaly.    Cardiovascular:      Rate and Rhythm: Normal rate and regular rhythm.      Heart sounds: Normal heart sounds. No murmur heard.  Pulmonary:      Effort: Pulmonary effort is normal. No respiratory distress.      Breath sounds: Normal breath sounds. No wheezing or rhonchi.   Abdominal:      Palpations: Abdomen is soft. There is no mass.      Tenderness: There is no abdominal tenderness.   Musculoskeletal:      Cervical back: Neck supple.   Lymphadenopathy:      Cervical: No cervical adenopathy.   Skin:     General: Skin is warm and dry.      Findings: No rash.   Neurological:      Mental Status: He is alert.   Psychiatric:         Mood and Affect: Mood normal.         Behavior: Behavior normal.         ASSESSMENT AND PLAN:     Encounter Diagnoses   Name Primary?    Preop examination Yes    Hemorrhoids, unspecified hemorrhoid type     Mild intermittent asthma without complication (HCC)     Paralysis of both lower limbs (HCC)        This is a 29-year-old  male with asthma, nicotine dependence, history of DVT with IVC filter in place and paralysis of bilateral lower extremities who presents for preop history and physical with medical risk assessment as requested by his surgeon Dr. Guerrero prior to hemorrhoidectomy scheduled for 2/24/2025, patient thinks at [facility] under general anesthesia.    History reviewed and physical exam performed.    This patient may proceed with hemorrhoid surgery under general anesthesia, patient is low risk for this procedure.      1. Preop examination  2. Hemorrhoids, unspecified hemorrhoid type  Patient to proceed with hemorrhoid surgery.    3. Mild intermittent asthma without complication (HCC)  Asthma is well-controlled, ACT in the office today was 25.  Recommend continue albuterol as needed.    4. Paralysis of both lower limbs (HCC)  Patient to follow-up with Dr. Galeano as planned.        Return in about 4 weeks (around 3/12/2025) for Medicare annual wellness visit.              [1]   Allergies  Allergen Reactions    Seasonal Runny nose

## 2025-02-12 NOTE — PATIENT INSTRUCTIONS
-We will call you with any preop testing that needs to be completed.  If you do not hear from us by the end of the business day tomorrow Thursday, 2/13/2025, please call and speak to our nurse.

## 2025-02-13 ENCOUNTER — TELEPHONE (OUTPATIENT)
Dept: FAMILY MEDICINE CLINIC | Facility: CLINIC | Age: 30
End: 2025-02-13

## 2025-02-13 NOTE — TELEPHONE ENCOUNTER
Called patient to inform him that he is all cleared for his surgery and that his paperwork has been faxed over to Dr Guerrero office.

## 2025-03-12 ENCOUNTER — TELEPHONE (OUTPATIENT)
Dept: PHYSICAL MEDICINE AND REHAB | Age: 30
End: 2025-03-12

## 2025-03-12 DIAGNOSIS — N30.00 ACUTE CYSTITIS WITHOUT HEMATURIA: Primary | ICD-10-CM

## 2025-03-12 DIAGNOSIS — R25.2 CRAMP AND SPASM: ICD-10-CM

## 2025-03-12 DIAGNOSIS — G82.20 PARAPLEGIA, UNSPECIFIED (CMD): Primary | ICD-10-CM

## 2025-03-13 ENCOUNTER — MED REC SCAN ONLY (OUTPATIENT)
Dept: FAMILY MEDICINE CLINIC | Facility: CLINIC | Age: 30
End: 2025-03-13

## 2025-03-14 ENCOUNTER — LAB SERVICES (OUTPATIENT)
Dept: LAB | Age: 30
End: 2025-03-14

## 2025-03-14 DIAGNOSIS — R25.2 CRAMP AND SPASM: ICD-10-CM

## 2025-03-14 DIAGNOSIS — G82.20 PARAPLEGIA, UNSPECIFIED (CMD): ICD-10-CM

## 2025-03-14 PROCEDURE — 87186 SC STD MICRODIL/AGAR DIL: CPT | Performed by: CLINICAL MEDICAL LABORATORY

## 2025-03-14 PROCEDURE — 87086 URINE CULTURE/COLONY COUNT: CPT | Performed by: CLINICAL MEDICAL LABORATORY

## 2025-03-14 PROCEDURE — 81001 URINALYSIS AUTO W/SCOPE: CPT | Performed by: CLINICAL MEDICAL LABORATORY

## 2025-03-14 PROCEDURE — 87077 CULTURE AEROBIC IDENTIFY: CPT | Performed by: CLINICAL MEDICAL LABORATORY

## 2025-03-15 LAB
APPEARANCE UR: ABNORMAL
BACTERIA #/AREA URNS HPF: ABNORMAL /HPF
BILIRUB UR QL STRIP: NEGATIVE
COLOR UR: YELLOW
GLUCOSE UR STRIP-MCNC: NEGATIVE MG/DL
HGB UR QL STRIP: ABNORMAL
HYALINE CASTS #/AREA URNS LPF: ABNORMAL /LPF
KETONES UR STRIP-MCNC: NEGATIVE MG/DL
LEUKOCYTE ESTERASE UR QL STRIP: ABNORMAL
MUCOUS THREADS URNS QL MICRO: PRESENT
NITRITE UR QL STRIP: POSITIVE
PH UR STRIP: 7.5 [PH] (ref 5–7)
PROT UR STRIP-MCNC: 30 MG/DL
RBC #/AREA URNS HPF: ABNORMAL /HPF
SP GR UR STRIP: >1.03 (ref 1–1.03)
SQUAMOUS #/AREA URNS HPF: ABNORMAL /HPF
UROBILINOGEN UR STRIP-MCNC: 0.2 MG/DL
WBC #/AREA URNS HPF: >100 /HPF
YEAST URNS QL MICRO: PRESENT

## 2025-03-16 LAB — BACTERIA UR CULT: ABNORMAL

## 2025-03-16 RX ORDER — SULFAMETHOXAZOLE AND TRIMETHOPRIM 800; 160 MG/1; MG/1
1 TABLET ORAL 2 TIMES DAILY
Qty: 14 TABLET | Refills: 0 | Status: SHIPPED | OUTPATIENT
Start: 2025-03-16 | End: 2025-03-23

## 2025-04-23 RX ORDER — PREGABALIN 300 MG/1
300 CAPSULE ORAL DAILY
Qty: 180 CAPSULE | Refills: 1 | Status: SHIPPED | OUTPATIENT
Start: 2025-04-23

## 2025-05-01 ENCOUNTER — TELEPHONE (OUTPATIENT)
Dept: FAMILY MEDICINE CLINIC | Facility: CLINIC | Age: 30
End: 2025-05-01

## 2025-05-01 ENCOUNTER — OFFICE VISIT (OUTPATIENT)
Dept: PHYSICAL MEDICINE AND REHAB | Age: 30
End: 2025-05-01

## 2025-05-01 VITALS
DIASTOLIC BLOOD PRESSURE: 82 MMHG | RESPIRATION RATE: 18 BRPM | TEMPERATURE: 97.4 F | SYSTOLIC BLOOD PRESSURE: 133 MMHG | HEART RATE: 75 BPM

## 2025-05-01 DIAGNOSIS — M62.838 MUSCLE SPASTICITY: Primary | ICD-10-CM

## 2025-05-01 DIAGNOSIS — G82.20 PARAPLEGIA, UNSPECIFIED (CMD): ICD-10-CM

## 2025-05-01 ASSESSMENT — PAIN SCALES - GENERAL: PAINLEVEL_OUTOF10: 0

## 2025-05-01 NOTE — TELEPHONE ENCOUNTER
Called patient to see if could schedule his yearly physical with . Left message to call office back to schedule.

## 2025-05-08 ENCOUNTER — TELEPHONE (OUTPATIENT)
Dept: FAMILY MEDICINE CLINIC | Facility: CLINIC | Age: 30
End: 2025-05-08

## 2025-05-08 ENCOUNTER — MED REC SCAN ONLY (OUTPATIENT)
Dept: FAMILY MEDICINE CLINIC | Facility: CLINIC | Age: 30
End: 2025-05-08

## 2025-05-08 NOTE — TELEPHONE ENCOUNTER
Fax received to complete by Dr Justice for catheter supplies. Signed and faxed back to [phone]

## 2025-07-01 ENCOUNTER — LAB SERVICES (OUTPATIENT)
Dept: LAB | Age: 30
End: 2025-07-01

## 2025-07-01 ENCOUNTER — TELEPHONE (OUTPATIENT)
Dept: PHYSICAL MEDICINE AND REHAB | Age: 30
End: 2025-07-01

## 2025-07-01 DIAGNOSIS — R30.0 DYSURIA: ICD-10-CM

## 2025-07-01 DIAGNOSIS — R30.0 DYSURIA: Primary | ICD-10-CM

## 2025-07-01 LAB
APPEARANCE UR: ABNORMAL
BACTERIA #/AREA URNS HPF: ABNORMAL /HPF
BILIRUB UR QL STRIP: NEGATIVE
COLOR UR: YELLOW
GLUCOSE UR STRIP-MCNC: NEGATIVE MG/DL
HGB UR QL STRIP: ABNORMAL
HYALINE CASTS #/AREA URNS LPF: ABNORMAL /LPF
KETONES UR STRIP-MCNC: NEGATIVE MG/DL
LEUKOCYTE ESTERASE UR QL STRIP: ABNORMAL
MUCOUS THREADS URNS QL MICRO: PRESENT
NITRITE UR QL STRIP: NEGATIVE
PH UR STRIP: 5.5 [PH] (ref 5–7)
PROT UR STRIP-MCNC: NEGATIVE MG/DL
RBC #/AREA URNS HPF: ABNORMAL /HPF
SP GR UR STRIP: 1.02 (ref 1–1.03)
SQUAMOUS #/AREA URNS HPF: ABNORMAL /HPF
UROBILINOGEN UR STRIP-MCNC: 0.2 MG/DL
WBC #/AREA URNS HPF: ABNORMAL /HPF

## 2025-07-01 PROCEDURE — 87186 SC STD MICRODIL/AGAR DIL: CPT | Performed by: CLINICAL MEDICAL LABORATORY

## 2025-07-01 PROCEDURE — 87086 URINE CULTURE/COLONY COUNT: CPT | Performed by: CLINICAL MEDICAL LABORATORY

## 2025-07-01 PROCEDURE — 87077 CULTURE AEROBIC IDENTIFY: CPT | Performed by: CLINICAL MEDICAL LABORATORY

## 2025-07-01 PROCEDURE — 81001 URINALYSIS AUTO W/SCOPE: CPT | Performed by: CLINICAL MEDICAL LABORATORY

## 2025-07-02 PROBLEM — F32.2 MDD (MAJOR DEPRESSIVE DISORDER), SINGLE EPISODE, SEVERE (HCC): Status: ACTIVE | Noted: 2025-07-02

## 2025-07-03 ENCOUNTER — OFFICE VISIT (OUTPATIENT)
Dept: WOUND CARE | Facility: HOSPITAL | Age: 30
End: 2025-07-03
Attending: Other
Payer: MEDICARE

## 2025-07-03 PROBLEM — F12.20 CANNABIS USE DISORDER, SEVERE, DEPENDENCE (HCC): Chronic | Status: ACTIVE | Noted: 2025-07-03

## 2025-07-03 PROBLEM — F17.200 NICOTINE USE DISORDER: Status: ACTIVE | Noted: 2023-11-08
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
## 2025-07-03 PROBLEM — Z94.7 STATUS POST CORNEAL TRANSPLANT: Status: ACTIVE | Noted: 2025-07-03

## 2025-07-03 PROBLEM — F06.31 DEPRESSIVE DISORDER DUE TO ANOTHER MEDICAL CONDITION WITH DEPRESSIVE FEATURES: Chronic | Status: ACTIVE | Noted: 2025-07-03

## 2025-07-03 LAB
BACTERIA UR CULT: ABNORMAL
BACTERIA UR CULT: ABNORMAL

## 2025-07-03 RX ORDER — SULFAMETHOXAZOLE AND TRIMETHOPRIM 800; 160 MG/1; MG/1
1 TABLET ORAL 2 TIMES DAILY
Qty: 14 TABLET | Refills: 0 | Status: SHIPPED | OUTPATIENT
Start: 2025-07-03 | End: 2025-07-10

## 2025-07-05 PROBLEM — F32.2 CURRENT SEVERE EPISODE OF MAJOR DEPRESSIVE DISORDER WITHOUT PSYCHOTIC FEATURES WITHOUT PRIOR EPISODE (HCC): Status: ACTIVE | Noted: 2025-07-05

## 2025-07-05 PROBLEM — F19.90 SUBSTANCE USE DISORDER: Status: ACTIVE | Noted: 2025-07-05

## 2025-07-10 PROBLEM — R45.88: Chronic | Status: ACTIVE | Noted: 2023-01-25
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
## 2025-07-29 RX ORDER — SOLIFENACIN SUCCINATE 10 MG/1
10 TABLET, FILM COATED ORAL
COMMUNITY
Start: 2024-11-09 | End: 2025-07-29 | Stop reason: SDUPTHER

## 2025-07-30 ENCOUNTER — TELEPHONE (OUTPATIENT)
Dept: FAMILY MEDICINE CLINIC | Facility: CLINIC | Age: 30
End: 2025-07-30

## 2025-07-30 ENCOUNTER — E-ADVICE (OUTPATIENT)
Dept: NEUROSURGERY | Age: 30
End: 2025-07-30

## 2025-07-30 ENCOUNTER — MED REC SCAN ONLY (OUTPATIENT)
Dept: FAMILY MEDICINE CLINIC | Facility: CLINIC | Age: 30
End: 2025-07-30

## 2025-07-30 ENCOUNTER — TELEPHONE (OUTPATIENT)
Dept: NEUROSURGERY | Age: 30
End: 2025-07-30

## 2025-07-31 RX ORDER — PREGABALIN 300 MG/1
300 CAPSULE ORAL DAILY
Qty: 180 CAPSULE | Refills: 1 | Status: SHIPPED | OUTPATIENT
Start: 2025-07-31

## 2025-07-31 RX ORDER — LIDOCAINE 50 MG/G
1 PATCH TOPICAL EVERY 24 HOURS
Qty: 60 PATCH | Refills: 4 | Status: SHIPPED | OUTPATIENT
Start: 2025-07-31

## 2025-07-31 RX ORDER — SOLIFENACIN SUCCINATE 10 MG/1
10 TABLET, FILM COATED ORAL DAILY
Qty: 30 TABLET | Refills: 0 | Status: SHIPPED | OUTPATIENT
Start: 2025-07-31 | End: 2025-08-30

## 2025-08-06 ENCOUNTER — TELEMEDICINE (OUTPATIENT)
Dept: FAMILY MEDICINE CLINIC | Facility: CLINIC | Age: 30
End: 2025-08-06

## 2025-08-06 DIAGNOSIS — Z00.00 MEDICARE ANNUAL WELLNESS VISIT, SUBSEQUENT: Primary | ICD-10-CM

## 2025-08-06 DIAGNOSIS — J45.30 MILD PERSISTENT ASTHMA WITHOUT COMPLICATION (HCC): ICD-10-CM

## 2025-08-06 DIAGNOSIS — D64.9 ANEMIA, UNSPECIFIED TYPE: ICD-10-CM

## 2025-08-06 DIAGNOSIS — Z79.899 ENCOUNTER FOR LONG-TERM CURRENT USE OF MEDICATION: ICD-10-CM

## 2025-08-06 PROBLEM — I82.409 DVT (DEEP VENOUS THROMBOSIS) (HCC): Status: ACTIVE | Noted: 2022-01-01

## 2025-08-06 PROBLEM — F41.9 ANXIETY: Status: ACTIVE | Noted: 2025-08-06

## 2025-08-06 PROBLEM — R45.851 SUICIDAL THOUGHTS: Status: ACTIVE | Noted: 2025-07-02

## 2025-08-06 PROBLEM — J45.20 MILD INTERMITTENT ASTHMA WITHOUT COMPLICATION (HCC): Status: RESOLVED | Noted: 2024-01-22 | Resolved: 2025-08-06

## 2025-08-06 PROBLEM — G89.29 CHRONIC PAIN: Status: ACTIVE | Noted: 2025-08-06

## 2025-08-06 RX ORDER — ALBUTEROL SULFATE 90 UG/1
2 INHALANT RESPIRATORY (INHALATION) EVERY 6 HOURS PRN
Qty: 18 EACH | Refills: 0 | Status: SHIPPED | OUTPATIENT
Start: 2025-08-06

## 2025-08-06 RX ORDER — OFLOXACIN 3 MG/ML
1 SOLUTION/ DROPS OPHTHALMIC 3 TIMES DAILY
COMMUNITY
Start: 2025-03-02

## 2025-08-06 RX ORDER — LITHIUM CARBONATE 300 MG/1
300 TABLET, FILM COATED, EXTENDED RELEASE ORAL NIGHTLY
COMMUNITY
Start: 2025-07-23

## 2025-10-13 ENCOUNTER — APPOINTMENT (OUTPATIENT)
Dept: PHYSICAL MEDICINE AND REHAB | Age: 30
End: 2025-10-13

## 2025-10-14 ENCOUNTER — APPOINTMENT (OUTPATIENT)
Dept: PHYSICAL MEDICINE AND REHAB | Age: 30
End: 2025-10-14

## (undated) DEVICE — POSITIONER OR RSPBRY 8.5X8X4IN HEAD FOAM HI RSLNT SLOT LF

## (undated) DEVICE — GLOVE SURG 8 PREMIERPRO LF LIGHT GRN ORTHOPAEDIC PF TXTR

## (undated) DEVICE — POSITIONER OR ULN NRV FOAM CONVOLUTE

## (undated) DEVICE — ELECTRODE ESURG BLADE 2.75IN .2IN 332IN INSULATE STD SHAFT

## (undated) DEVICE — KIT STIM CMUNC HNDST PROXIMATE REUSE NS LF

## (undated) DEVICE — BLANKET WRM LWR BODY ADULT 60X36IN BR HGR PLMR FT DRAPE ADH

## (undated) DEVICE — ELECTRODE PT RTN C30- LB 9FT CORD NONIRRITATE NONSENSITIZE

## (undated) DEVICE — KIT RM TURNOVER CSTM IC DISP NS LF

## (undated) DEVICE — SUTURE VICRYL 3-0 SH 18IN CNTRL RELS BRAID 8 STRN COAT ABS J864D

## (undated) DEVICE — FORCEP BIOPSY RJ4 LG CAP W/ND

## (undated) DEVICE — KIT PROC CSTM PROC KIT

## (undated) DEVICE — SUTURE NUROLON 0 CT1 TAPERPOINT 18IN NYL BRAID CNTRL RELS C521D

## (undated) DEVICE — TOWEL OR BLU 16X26IN 4PK

## (undated) DEVICE — DRESSING ADH 14X4IN PAD WTPRF NONWOVEN GAUZE 12X2IN STRL LF

## (undated) DEVICE — WATER STRL 1000ML PLASTIC POUR BTL LF

## (undated) DEVICE — DRAPE ABD MAJ POUCH SURG TIBURON

## (undated) DEVICE — UNIVERSAL POWER ADAPTER

## (undated) DEVICE — Device: Brand: DEFENDO AIR/WATER/SUCTION AND BIOPSY VALVE

## (undated) DEVICE — GLOVE SURG 6.5 PREMIERPRO LF NATURAL PF TXTR SMTH STRL

## (undated) DEVICE — SPONGE GAUZE 4X4IN CTN 16 PLY WOVEN FOLD EDGE STRL LF

## (undated) DEVICE — GLOVE SURG 6 PREMIERPRO LF NATURAL PF TXTR SMTH STRL

## (undated) DEVICE — TUBING SCT CLR 12FT 316IN MDVC MAXI-GRIP NCDTV MALE TO MALE

## (undated) DEVICE — DONUT PSTN 7IN HEAD FOAM

## (undated) DEVICE — GLOVE SURG 6.5 PREMIERPRO LF GRN PF TXTR STRL PLISPRN DISP

## (undated) DEVICE — SYRINGE 18GA 1.5IN 3ML BLUNT FILL NDL CONC TIP LL HUB

## (undated) DEVICE — Device

## (undated) NOTE — ED AVS SNAPSHOT
[facility] Emergency Department    [place]  [address] [address]    Phone:  [phone]    Fax:  [phone]           Russell Ernst   MRN: [id number]    Department:  [facility] Emergency Department   Date of Visit:  6/1 IF THERE IS ANY CHANGE OR WORSENING OF YOUR CONDITION, CALL YOUR PRIMARY CARE PHYSICIAN AT ONCE OR RETURN IMMEDIATELY TO THE EMERGENCY DEPARTMENT.     If you have been prescribed any medication(s), please fill your prescription right away and begin taking t

## (undated) NOTE — Clinical Note
2017    Patient: Bran Sheffield  : 2/15/1995 Visit date: 2017    Dear  Dr. Fab Guaman, DO,    Thank you for referring Bran Sheffield to my practice. Please find my assessment and plan below.         Assessment   Coccygodynia  (primary encoun He has no history of recent trauma. He has never had bedsores. He states that the swelling is new, the pain at the site is new.     He points again to the level of approximately L4, L5, S1.    Clinical examination of the back and gluteal cleft region re she sits for more than 20-30 minutes to see if there is any particular device, chair, or position, that puts strain on this particular site. Patient's impaired sensation would allow him to sit in a prolonged fashion on this area that is tender.     This pa

## (undated) NOTE — MR AVS SNAPSHOT
[facility] General Surgery  [address]  [facility] [address] [address] [phone]               Thank you for choosing us for your health care visit with Odell Shane MD.  We are glad to serve you and happy to provide you with this summary of you He points to an area near the L5-S1 junction and slightly above. He does have \"pain\" radiating to the tip of the tailbone near the anus. He complains of swelling at that site. He has had no fever or chills.     The patient's mother reports that they extent of this patient's ability to have sensation to this region. He may have suffered a more traumatic event then he is personally aware.     The mother reports no recent falls or accidents as well, however states that he is very aggressive and position TAKE 2 PUFFS BY MOUTH EVERY 4 HOURS AS NEEDED   Commonly known as:  PROAIR HFA           * amphetamine-dextroamphetamine 20 MG Tabs   1 tab bid   Commonly known as:  ADDERALL           * amphetamine-dextroamphetamine 20 MG Tabs   1 tab bid   Commonly known discharge instructions in [employer] by going to Visits < Admission Summaries. If you've been to the Emergency Department or your doctor's office, you can view your past visit information in [employer] by going to Visits < Visit Summaries. Toodalu questions?

## (undated) NOTE — LETTER
ASTHMA ACTION PLAN for Riaz Davies     : 2/15/1995          Date: 10/30/2024    Tierra Justice,   [facility], \[facility]\"", [place]  [id number] [place] [address] [address]  [facility] [address] [phone] 1.  GREEN - GO!  % Personal Best Peak Flow. Use controller medicine.   2.  YELLOW - Caution. 50-79% Personal Best Peak Flow.  Use reliever meds.   3.  RED - Stop. <50% Personal Best Peak Flow.  Get help from a doctor.  SDon’t forget to rinse your mouth after using steroid inhalers.  RRemember to get your Flu vaccine every fall!   ACT Score: 23  ACT Goal: 20 or greater    1. GREEN - Go! Use controller medicine.   Breathing is good, No cough or wheeze, Can work and play Medicine:  Budesonide-Formoterol (Symbicort HFA):  2 puffs per day           2. YELLOW - Caution. Take reliever medicine to keep asthma attack from getting bad.   Cough, Wheezing, Tight Chest, Wake up at night Medicine:  Call PCP if no relief or symptoms are worse after 2 treatments  Albuterol Nebulizer (Proair): 2 unit dose every 4 to 6 hours as needed           3. RED - Stop! Danger! Get help from a doctor now!   Medicine not helping, Breathing hard & fast, Nose opens wide, Can't walk, Ribs show, Can't talk well Medicine:   Call 911 or go to Emergency Room if no relief after 2 treatments or symptoms are worse  Albuterol Nebulizer (Proair): 2 unit dose every 20 minutes  Levalbuterol Inhaler (Xopenex): 2 puffs every 20 minutes    If your symptoms do not improve and you cannot contact your doctor, go to the emergency room or call 911 immediately!   Seek medical attention if you require your rescue inhaler/medication more than 2-3 times in a 24 hour period. If you require your rescue inhaler/medication more than 2-3 times weekly, your asthma may not be under proper control and you should contact your healthcare provider.   Please schedule your follow-up asthma appointment today!  [x] Asthma Action Plan reviewed with patient  (and caregiver if necessary) and a copy of the plan was given to the patient/caregiver.   [x] Asthma Action Plan reviewed with patient (and caregiver if necessary) on the phone and mailed copy to patient.         Physician Patient Caretaker (if necessary)   DO Riaz Salas

## (undated) NOTE — IP AVS SNAPSHOT
[facility] [place] [address] [place], [address] ~ [phone]                Discharge Summary   4/6/2017    [facility]           Admission Information        Provider Department    4/6/2017 Josemanuel Gomez MD  Endoscopy Mometasone Furo-Formoterol Fum 200-5 MCG/ACT Aero   Commonly known as:  DULERA        Inhale 2 puffs into the lungs 2 (two) times daily.     Froy Moran     [    ]    [    ]    [    ]    [    ]       morphINE Sulfate (PF) in Sodium Chloride IT infusio **If unable to reach your doctor, please go to the [facility] Emergency Room**    - Your referring physician will receive a full report of your examination.  - If you do not hear from your doctor's office within two weeks of your biopsy, please call t You can access your MyChart to more actively manage your health care and view more details from this visit by going to [email or web address].   If you've recently had a stay at the Hospital you can access your discharge instructions in [address] by aureliano

## (undated) NOTE — LETTER
Date: 4/6/2017    Patient Name: Alec Barrientos          To Whom it may concern: This letter has been written at the patient's request. The above patient was seen at the [facility] for treatment of a medical condition.     This patient leobardo

## (undated) NOTE — ED AVS SNAPSHOT
[facility] Emergency Department    [place]  [address] [address]    Phone:  [phone]    Fax:  [phone]           Melissa Meyers   MRN: [id number]    Department:  [facility] Emergency Department   Date of Visit:  6/1 Take 1 tablet (40 mg total) by mouth 2 (two) times daily. Sulfamethoxazole-TMP -160 MG Tabs per tablet   Quantity:  20 tablet   Commonly known as:  BACTRIM DS   Take 1 tablet by mouth 2 (two) times daily.             Where to Get Your Medication tell this physician (or your personal doctor if your instructions are to return to your personal doctor) about any new or lasting problems. The primary care or specialist physician will see patients referred from the [facility] Emergency Department.  Donal Stock - If you are a smoker or have smoked in the last 12 months, we encourage you to explore options for quitting.     - If you have concerns related to behavioral health issues or thoughts of harming yourself, contact [facility] a Call [phone] for help. [email or web address] is NOT to be used for urgent needs. For medical emergencies, dial 911.

## (undated) NOTE — LETTER
02/07/20    Dear Apurva Martin,    We are contacting you from Dr. Stephanie Heard office. Your health is important to us.   Therefore, we are sending this friendly reminder that you have the following overdue labs and/or tests which were ordered at your last office visi